# Patient Record
Sex: FEMALE | Race: AMERICAN INDIAN OR ALASKA NATIVE
[De-identification: names, ages, dates, MRNs, and addresses within clinical notes are randomized per-mention and may not be internally consistent; named-entity substitution may affect disease eponyms.]

---

## 2018-04-03 NOTE — MAMMOGRAPHY REPORT
BILATERAL DIGITAL SCREENING MAMMOGRAM with CAD: 04/02/18 10:34:00



CLINICAL: Routine screening.



COMPARISON:11/24/14



FINDINGS: The breasts are heterogeneously dense, which may obscure 

small masses.A right focal asymmetry with calcifications requires 

additional imaging.  The left breast is negative.



IMPRESSION: Right asymmetry and calcifications requiring further workup.



BI-RADS CATEGORY:  0 -- Additional Imaging Evaluation Required



RECOMMENDATION: Recall for right lateralmedial and spot magnification 

ML and CC views and right breast ultrasound.



ACR BI-RADS MAMMOGRAPHIC CODES:

0 = Needs additional imaging evaluation; 1 = Negative; 2 = Benign; 3 = 

Probably benign; 4 = Suspicious; 5 = Malignant; 6 = Known biopsy-proven 

malignancy



COMMENT:

      1.   Dense breast tissue, i.e., adenosis, fibrocystic 

            changes, etc., may obscure an underlying neoplasm.

      2.   Approximately 10% of cancers are not detected with

            mammography.

      3.   A negative mammography report should not delay biopsy 

            if a clinically suspicious mass is present.



COMMENT:

Patient follow-up letters are generated via our [employer] application.

## 2018-05-29 ENCOUNTER — HOSPITAL ENCOUNTER (OUTPATIENT)
Dept: HOSPITAL 5 - SPVWC | Age: 63
Discharge: HOME | End: 2018-05-29
Attending: INTERNAL MEDICINE
Payer: COMMERCIAL

## 2018-05-29 DIAGNOSIS — I10: ICD-10-CM

## 2018-05-29 DIAGNOSIS — N60.01: Primary | ICD-10-CM

## 2018-05-29 DIAGNOSIS — N64.89: ICD-10-CM

## 2018-05-29 DIAGNOSIS — J44.9: ICD-10-CM

## 2018-05-29 DIAGNOSIS — Z87.891: ICD-10-CM

## 2018-05-30 NOTE — MAMMOGRAPHY REPORT
RIGHT DIGITAL DIAGNOSTIC MAMMOGRAM : 05/29/18 10:31:00



CLINICAL: Recall to evaluate a right asymmetry with calcifications.



COMPARISON:11/24/14 screening mammogram



FINDINGS: Lateralmedial and spot magnification MLO and CC views were 

performed.  An irregular mass with ill-defined margins persists on all 

views.  Suspicious fine pleomorphic calcifications are associated with 

the mass and the calcifications scan at least 3 cm.  2 additional 

groups of calcifications on the MLO view are not significantly changed 

since the last mammogram.



Ultrasound of right breast (including all four quadrants and the 

retroareolar area) was performed.  A solid irregular hypoechoic mass at 

2 o'clock 3 cm from the nipple measures 1.2 x 0.9 x 0.8 cm and 

corresponds to the mammographic mass.  Calcifications are identified 

within the mass and there is some shadowing from the mass.  An oval 

solid relatively smooth mass at 11 o'clock 3 cm from nipple measures 8 

x 3 x 8 mm.  A solid irregular hypoechoic official retroareolar mass at 

3 o'clock measures 6 x 5 x 5 mm.  A cyst at 10 o'clock 3 cm from the 

nipple measures four a 4 x 3 mm and a cyst at 8 o'clock 6 cm from the 

nipple measures 4 x 2 x 4 mm.  Ultrasound of the right axilla 

demonstrated no suspicious lymph nodes.





IMPRESSION: A highly suspicious 1.2 cm right breast mass with highly 

suspicious calcifications at 2 o'clock 3 cm from the nipple.  Recommend 

ultrasound guided needle core biopsy of the right breast.



BI-RADS CATEGORY:  4--Suspicious



I discussed the findings and the recommendation for needle core biopsy 

of the right breast with the patient at the time of the examination.



ACR BI-RADS MAMMOGRAPHIC CODES:

0 = Needs additional imaging evaluation; 1 = Negative; 2 = Benign; 3 = 

Probably benign; 4 = Suspicious; 5 = Malignant; 6 = Known biopsy-proven 

malignancy



COMMENT:

      1.   Dense breast tissue, i.e., adenosis, fibrocystic 

            changes, etc., may obscure an underlying neoplasm.

      2.   Approximately 10% of cancers are not detected with

            mammography.

      3.   A negative mammography report should not delay biopsy 

            if a clinically suspicious mass is present.





COMMENT:

Patient follow-up letters are generated via our [employer] 

application.

## 2018-05-30 NOTE — ULTRASOUND REPORT
RIGHT DIGITAL DIAGNOSTIC MAMMOGRAM : 05/29/18 10:31:00



CLINICAL: Recall to evaluate a right asymmetry with calcifications.



COMPARISON:11/24/14 screening mammogram



FINDINGS: Lateralmedial and spot magnification MLO and CC views were 

performed.  An irregular mass with ill-defined margins persists on all 

views.  Suspicious fine pleomorphic calcifications are associated with 

the mass and the calcifications scan at least 3 cm.  2 additional 

groups of calcifications on the MLO view are not significantly changed 

since the last mammogram.



Ultrasound of right breast (including all four quadrants and the 

retroareolar area) was performed.  A solid irregular hypoechoic mass at 

2 o'clock 3 cm from the nipple measures 1.2 x 0.9 x 0.8 cm and 

corresponds to the mammographic mass.  Calcifications are identified 

within the mass and there is some shadowing from the mass.  An oval 

solid relatively smooth mass at 11 o'clock 3 cm from nipple measures 8 

x 3 x 8 mm.  A solid irregular hypoechoic official retroareolar mass at 

3 o'clock measures 6 x 5 x 5 mm.  A cyst at 10 o'clock 3 cm from the 

nipple measures four a 4 x 3 mm and a cyst at 8 o'clock 6 cm from the 

nipple measures 4 x 2 x 4 mm.  Ultrasound of the right axilla 

demonstrated no suspicious lymph nodes.





IMPRESSION: A highly suspicious 1.2 cm right breast mass with highly 

suspicious calcifications at 2 o'clock 3 cm from the nipple.  Recommend 

ultrasound guided needle core biopsy of the right breast.



BI-RADS CATEGORY:  4--Suspicious



I discussed the findings and the recommendation for needle core biopsy 

of the right breast with the patient at the time of the examination.



ACR BI-RADS MAMMOGRAPHIC CODES:

0 = Needs additional imaging evaluation; 1 = Negative; 2 = Benign; 3 = 

Probably benign; 4 = Suspicious; 5 = Malignant; 6 = Known biopsy-proven 

malignancy



COMMENT:

      1.   Dense breast tissue, i.e., adenosis, fibrocystic 

            changes, etc., may obscure an underlying neoplasm.

      2.   Approximately 10% of cancers are not detected with

            mammography.

      3.   A negative mammography report should not delay biopsy 

            if a clinically suspicious mass is present.





COMMENT:

Patient follow-up letters are generated via our [employer] 

application.

## 2018-06-07 ENCOUNTER — HOSPITAL ENCOUNTER (OUTPATIENT)
Dept: HOSPITAL 5 - SPVWC | Age: 63
Discharge: HOME | End: 2018-06-07
Attending: INTERNAL MEDICINE
Payer: COMMERCIAL

## 2018-06-07 DIAGNOSIS — Z17.0: ICD-10-CM

## 2018-06-07 DIAGNOSIS — D05.11: ICD-10-CM

## 2018-06-07 DIAGNOSIS — C50.211: Primary | ICD-10-CM

## 2018-06-07 PROCEDURE — A4648 IMPLANTABLE TISSUE MARKER: HCPCS

## 2018-06-07 PROCEDURE — 77065 DX MAMMO INCL CAD UNI: CPT

## 2018-06-07 PROCEDURE — 88361 TUMOR IMMUNOHISTOCHEM/COMPUT: CPT

## 2018-06-07 PROCEDURE — 88342 IMHCHEM/IMCYTCHM 1ST ANTB: CPT

## 2018-06-07 PROCEDURE — 19083 BX BREAST 1ST LESION US IMAG: CPT

## 2018-06-07 PROCEDURE — 88305 TISSUE EXAM BY PATHOLOGIST: CPT

## 2018-06-07 NOTE — ULTRASOUND REPORT
ULTRASOUND GUIDED NEEDLE CORE BIOPSY RIGHT BREAST WITH CLIP PLACEMENT: 

06/07/18 09:30:00



CLINICAL: A 1.2 cm right breast mass at 2 o'clock 3 cm from the nipple.



COMPARISON :05/29/18



FINDINGS: The procedure was explained to the patient and informed 

consent was obtained.  



Ultrasound demonstrated the previously described mass.



I marked the breast with a felt tip marker and a time out was called. 

The skin was prepped with Betadine and anesthetized with 1% lidocaine. 

Needle core biopsy was performed through a tiny dermatotomy using 

ultrasound guidance, 2% lidocaine with epinephrine for deep anesthesia 

and a 14-gauge Achieve biopsy device.  3 cores were obtained and placed 

in formalin.  A clip was deployed within the mass. The patient 

tolerated the procedure well and there were no apparent complications.  

Hemostasis was achieved with minimal pressure and a sterile dressing 

was applied. A two view mammogram demonstrated concordant placement of 

the clip. She left the department in good condition and was given 

instructions for wound care and followup.



IMPRESSION: Uncomplicated ultrasound guided needle core biopsy with 

clip placement right breast.

## 2018-06-07 NOTE — MAMMOGRAPHY REPORT
RIGHT DIGITAL DIAGNOSTIC MAMMOGRAM: 06/07/18 08:43:00



CLINICAL: For clip placement immediately status post ultrasound biopsy.



COMPARISON:05/29/18



FINDINGS: A biopsy clip is now identified within the mass at 2 o'clock. 





IMPRESSION: Concordant clip placement status post ultrasound biopsy.



BI-RADS CATEGORY:  4--Suspicious



Pathology pending.

## 2018-07-09 ENCOUNTER — HOSPITAL ENCOUNTER (OUTPATIENT)
Dept: HOSPITAL 5 - SPVIMAG | Age: 63
Discharge: HOME | End: 2018-07-09
Attending: SURGERY
Payer: COMMERCIAL

## 2018-07-09 DIAGNOSIS — C50.211: Primary | ICD-10-CM

## 2018-07-09 DIAGNOSIS — I10: ICD-10-CM

## 2018-07-09 DIAGNOSIS — Z87.891: ICD-10-CM

## 2018-07-09 DIAGNOSIS — J44.9: ICD-10-CM

## 2018-07-09 PROCEDURE — 77059: CPT

## 2018-07-09 PROCEDURE — C8908 MRI W/O FOL W/CONT, BREAST,: HCPCS

## 2018-07-09 PROCEDURE — A9577 INJ MULTIHANCE: HCPCS

## 2018-07-10 ENCOUNTER — HOSPITAL ENCOUNTER (OUTPATIENT)
Dept: HOSPITAL 5 - SPVWC | Age: 63
Discharge: HOME | End: 2018-07-10
Attending: SURGERY
Payer: COMMERCIAL

## 2018-07-10 DIAGNOSIS — Z87.891: ICD-10-CM

## 2018-07-10 DIAGNOSIS — J44.9: ICD-10-CM

## 2018-07-10 DIAGNOSIS — I10: ICD-10-CM

## 2018-07-10 DIAGNOSIS — C50.911: Primary | ICD-10-CM

## 2018-07-10 NOTE — MAMMOGRAPHY REPORT
RIGHT DIGITAL DIAGNOSTIC MAMMOGRAM : 07/10/18 12:12:00



CLINICAL: Recently diagnosed right breast cancer and additional 

suspicious calcifications in the same breast on previous mammograms.



COMPARISON:06/07/18, 05/29/18 and 04/02/18



FINDINGS: Spot magnification ML and CT views were performed and 

demonstrate several groups of suspicious calcifications which are 

separate from the known cancer.  The known cancer is medial and there 

are numerous amorphous calcifications in the outer breast which have 

similar morphology as the cancer.  These calcifications may be in the 

inferior outer breast since there is suggestion of similar 

calcifications on the ML mag view.  I have labeled as target #1.  In 

addition, a cluster of calcifications in the inner breast have 

suspicious features.  They're located approximately 5 cm from the 

leading edge of the known cancer on the lateral spot image and are 

labeled as target #2.  These calcifications are superior and posterior 

to the cancer.





IMPRESSION: Known right breast cancer and at least 2 groups of 

suspicious calcifications for which stereotactic biopsy is recommended.



BI-RADS CATEGORY:  6--Known Cancer







ACR BI-RADS MAMMOGRAPHIC CODES:

0 = Needs additional imaging evaluation; 1 = Negative; 2 = Benign; 3 = 

Probably benign; 4 = Suspicious; 5 = Malignant; 6 = Known biopsy-proven 

malignancy



COMMENT:

      1.   Dense breast tissue, i.e., adenosis, fibrocystic 

            changes, etc., may obscure an underlying neoplasm.

      2.   Approximately 10% of cancers are not detected with

            mammography.

      3.   A negative mammography report should not delay biopsy 

            if a clinically suspicious mass is present.





COMMENT:

Patient follow-up letters are generated by our [employer] application.

## 2018-07-10 NOTE — MAGNETIC RESONANCE REPORT
BILATERAL BREAST MRI WITHOUT AND WITH CONTRAST: 07/09/18 12:28:00



CLINICAL: Newly diagnosed right breast cancer.  Status post right 

ultrasound-guided needle biopsy 06/07/18 with pathologic diagnosis of 

invasive carcinoma NOS, Daljit grade II.



COMPARISON:Recent mammograms and right breast ultrasound..



TECHNIQUE: Axial 1.0-mm T1 without,  axial high resolution 2.0-mm T2 

and axial 1.0-mm dynamic Vibrant high-resolution postcontrast T1 fat 

saturation sequences on a 1.5 Ledy magnet.  The examination was 

performed with an 8 channel dedicated [employer] breast coil. Post 

processing with CAD and subtraction was performed on an [employer] 

workstation.  18.0 cc of Multihance was injected without incident for 

the contrast portion of the exam. Consent was obtained prior to the 

administration of the contrast.  



FINDINGS:



Right: Minimal background parenchymal enhancement.  The known cancer is 

an irregular enhancing mass with a biopsy clip at 3 o'clock 6.9 cm from 

the nipple measuring 2.3 x 1.2 x 1.0 cm.  It demonstrates heterogeneous 

enhancement with mixed kinetics, 111% peak enhancement, 44% type I 

persistent, 54% type II plateau and 2% type III washout.  Lesion 2 is 

focal non-Mass enhancement approximately 1 cm anterior and superior to 

the known cancer.  It measures 2.2 x 1.5 x 1.2 mm and demonstrates 123% 

peak enhancement, 59% type I persistent, 41% type II plateau and 0% 

type III washout.  No other mass or suspicious enhancement of the right 

breast.  A few tiny cysts and no masses to correlate with what are 

described to be solid masses at 11 o'clock 3 cm from the nipple and at 

3 o'clock subareolar on the recent ultrasound.  No suspicious right 

axillary or right internal mammary lymph nodes.



Left: Minimal background parenchymal enhancement.  No mass or 

suspicious enhancement.  No suspicious left axillary or left internal 

mammary lymph nodes.



IMPRESSION: 1.  A known 2.3 cm right breast cancer at 3 o'clock 

approximately 7 cm from the nipple.  2.  One additional 2 mm suspicious 

lesion within 1 cm of the known cancer but no other suspicious lesions 

of the right breast.  No lesions to correlate with solid masses 

described by recent ultrasound to be located subareolar at 3 o'clock 

and at 11 o'clock 3 cm from the nipple.  However, recent mammograms 

demonstrate suspicious calcifications which are separate from the known 

cancer and a two site right stereotactic biopsy is recommended to 

exclude multicentric tumor which may only be evident by calcifications. 

 3.  Negative left breast.  4.  No suspicious lymph nodes.



RIGHT BI-RADS  6 -- Known Cancer





LEFT BI-RADS 1 -- Negative

## 2018-07-18 ENCOUNTER — HOSPITAL ENCOUNTER (OUTPATIENT)
Dept: HOSPITAL 5 - SPVWC | Age: 63
Discharge: HOME | End: 2018-07-18
Attending: SURGERY
Payer: COMMERCIAL

## 2018-07-18 DIAGNOSIS — R92.0: ICD-10-CM

## 2018-07-18 DIAGNOSIS — C50.311: ICD-10-CM

## 2018-07-18 DIAGNOSIS — N60.21: Primary | ICD-10-CM

## 2018-07-18 PROCEDURE — A4648 IMPLANTABLE TISSUE MARKER: HCPCS

## 2018-07-18 PROCEDURE — 77065 DX MAMMO INCL CAD UNI: CPT

## 2018-07-18 PROCEDURE — 88305 TISSUE EXAM BY PATHOLOGIST: CPT

## 2018-07-18 PROCEDURE — 19081 BX BREAST 1ST LESION STRTCTC: CPT

## 2018-07-18 NOTE — MAMMOGRAPHY REPORT
STEREOTACTIC VACUUM ASSISTED BIOPSY WITH CLIP PLACEMENT RIGHT BREAST: 

07/18/18 10:03:00





CLINICAL: Known cancer in the lower inner quadrant of the same breast.



COMPARISON:07/10/18



FINDINGS: Consent for the procedure was obtained.  A group of 

suspicious calcifications in the upper outer quadrant was targeted with 

stereotactic guidance. 



The skin was prepped with Betadine and anesthetized with 1% lidocaine. 

2% lidocaine with epinephrine was injected for deeper anesthesia. 8 

gauge Mammotome biopsy was performed from a CC from above approach 

through a small dermatotomy.  Prefire and post-fire images demonstrated 

satisfactory positioning of the probe. Samples were obtained around the 

clock face.  A specimen radiograph confirmed satisfactory sampling with 

removal of representative punctate calcifications.  A clip was placed 

at the biopsy site and the deployment was confirmed with a radiograph.  

The probe was removed and hemostasis was achieved with mild pressure.  

A sterile dressing was applied.  The patient tolerated the procedure 

well and there were no apparent complications.  



A 2 view mammogram demonstrated removal of some calcifications and 

concordant deployment of the biopsy clip.



IMPRESSION: Uncomplicated stereotactic biopsy with clip placement right 

breast.The patient did not want to have a second site biopsied on this 

day but intends to return next week for stereotactic biopsy of a second 

group of calcifications.

## 2018-07-18 NOTE — MAMMOGRAPHY REPORT
RIGHT DIGITAL DIAGNOSTIC MAMMOGRAM: 07/18/18 10:03:00



CLINICAL: For clip placement immediately status post stereotactic 

biopsy of calcifications.



COMPARISON:07/10/18



FINDINGS: A biopsy clip is now identified in the upper outer quadrant 

and the clip position is concordant with the intended target.  Some 

calcifications have been removed. 





IMPRESSION: Concordant clip placement status post stereotactic 

biopsy.Known cancer in the same breast.



BI-RADS CATEGORY:  6--Known Cancer



Pathology pending.

## 2018-07-26 ENCOUNTER — HOSPITAL ENCOUNTER (OUTPATIENT)
Dept: HOSPITAL 5 - SPVWC | Age: 63
Discharge: HOME | End: 2018-07-26
Attending: SURGERY
Payer: COMMERCIAL

## 2018-07-26 DIAGNOSIS — N60.11: Primary | ICD-10-CM

## 2018-07-26 DIAGNOSIS — Z87.891: ICD-10-CM

## 2018-07-26 DIAGNOSIS — Z86.718: ICD-10-CM

## 2018-07-26 DIAGNOSIS — I10: ICD-10-CM

## 2018-07-26 DIAGNOSIS — J44.1: ICD-10-CM

## 2018-07-26 DIAGNOSIS — R92.0: ICD-10-CM

## 2018-07-26 PROCEDURE — 19081 BX BREAST 1ST LESION STRTCTC: CPT

## 2018-07-26 PROCEDURE — 77065 DX MAMMO INCL CAD UNI: CPT

## 2018-07-26 PROCEDURE — 88305 TISSUE EXAM BY PATHOLOGIST: CPT

## 2018-07-26 PROCEDURE — A4648 IMPLANTABLE TISSUE MARKER: HCPCS

## 2018-07-26 NOTE — MAMMOGRAPHY REPORT
STEREOTACTIC VACUUM ASSISTED BIOPSY WITH CLIP PLACEMENT RIGHT BREAST: 

07/26/18 09:52:00





CLINICAL: Known right breast cancer and additional suspicious 

calcifications in the same quadrant of the breast.



COMPARISON:07/10/18



FINDINGS: Consent for the procedure was obtained.  The group of 

calcifications labeled as target #2 on the previous mammogram was 

targeted with stereotactic guidance. 



The skin was prepped with Betadine and anesthetized with 1% lidocaine. 

2% lidocaine with epinephrine was injected for deeper anesthesia. 8 

gauge Mammotome biopsy was performed from a CC from above approach 

through a small dermatotomy.  Prefire and post-fire images demonstrated 

satisfactory positioning of the probe. Samples were obtained around the 

clock face.  A specimen radiograph confirmed satisfactory sampling with 

removal of representative calcifications.  A clip was placed at the 

biopsy site and the placement was confirmed with an image.  The probe 

was removed and hemostasis was achieved with mild pressure.  A sterile 

dressing was applied.  The patient tolerated the procedure well and 

there were no apparent complications.  



Two view mammogram demonstrated removal of calcifications in concordant 

deployment of the biopsy clip.The biopsy clip is approximately 3 cm 

posterior to the clip for the known cancer.



IMPRESSION: Uncomplicated stereotactic biopsy with clip placement right 

breast.

## 2018-07-26 NOTE — MAMMOGRAPHY REPORT
RIGHT DIGITAL DIAGNOSTIC MAMMOGRAM: 07/26/18 09:52:00



CLINICAL: For clip placement immediately status post stereotactic 

biopsy of the inner quadrant.  Known cancer in the same quadrant of the 

breast.  The current biopsy site correlates with target #2 from the 

07/10/18 mammogram.



COMPARISON:07/10/18 and 07/18/18 mammograms



FINDINGS: A new biopsy clip is now identified in the inner breast and 

it is located 3 cm posterior to the clip at the known cancer.  A biopsy 

clip in the upper outer quadrant closer to the nipple correlates with a 

benign biopsy performed on 07/18/18.





IMPRESSION: Concordant clip placement status post stereotactic biopsy 

of the inner right breast.



BI-RADS CATEGORY:  6--Known Cancer



Pathology pending.

## 2018-08-08 ENCOUNTER — HOSPITAL ENCOUNTER (OUTPATIENT)
Dept: HOSPITAL 5 - SPVWC | Age: 63
Discharge: HOME | End: 2018-08-08
Attending: SURGERY
Payer: COMMERCIAL

## 2018-08-08 DIAGNOSIS — I10: ICD-10-CM

## 2018-08-08 DIAGNOSIS — R92.0: ICD-10-CM

## 2018-08-08 DIAGNOSIS — Z72.89: ICD-10-CM

## 2018-08-08 DIAGNOSIS — Z79.899: ICD-10-CM

## 2018-08-08 DIAGNOSIS — J44.1: ICD-10-CM

## 2018-08-08 DIAGNOSIS — Z87.891: ICD-10-CM

## 2018-08-08 DIAGNOSIS — N64.89: Primary | ICD-10-CM

## 2018-08-08 PROCEDURE — A4648 IMPLANTABLE TISSUE MARKER: HCPCS

## 2018-08-08 PROCEDURE — 88305 TISSUE EXAM BY PATHOLOGIST: CPT

## 2018-08-08 NOTE — ULTRASOUND REPORT
ULTRASOUND GUIDED NEEDLE CORE BIOPSY RIGHT BREAST WITH CLIP PLACEMENT: 

08/08/18 



CLINICAL: Known right breast cancer.  Suspicious superficial 6 mm mass 

at 3 o'clock subareolar at the edge of the areola.



COMPARISON :05/29/18



FINDINGS: The procedure was explained to the patient and informed 

consent was obtained.  



Ultrasound demonstrated the previously described 6 mm subareolar mass 

at 3 o'clock..



I marked the breast with a felt tip marker and a time out was called. 

The skin was prepped with Betadine and anesthetized with 1% lidocaine. 

Needle core biopsy was performed through a tiny dermatotomy using 

ultrasound guidance, 2% lidocaine with epinephrine for deep anesthesia 

and a 14-gauge Achieve biopsy device.  Multiple cores were obtained and 

placed in formalin.  A clip was deployed within the mass. The patient 

tolerated the procedure well and there were no apparent complications.  

Hemostasis was achieved with minimal pressure and a sterile dressing 

was applied. A two view mammogram demonstrated satisfactory clip 

deployment. She left the department in good condition and was given 

instructions for wound care and followup.



IMPRESSION: Uncomplicated ultrasound guided needle core biopsy with 

clip placement right breast.

## 2018-08-08 NOTE — MAMMOGRAPHY REPORT
RIGHT DIGITAL DIAGNOSTIC MAMMOGRAM: 08/08/18 12:53:00



CLINICAL: For clip placement immediately status post ultrasound biopsy.



COMPARISON:07/26/18



FINDINGS: A biopsy clip is now identified at 3 o'clock in the anterior 

one third of the breast.  Three additional biopsy clips are identified. 





IMPRESSION: Concordant clip placement status post ultrasound biopsy.



BI-RADS CATEGORY:  4--Suspicious



Pathology pending.

## 2018-08-21 ENCOUNTER — HOSPITAL ENCOUNTER (INPATIENT)
Dept: HOSPITAL 5 - ED | Age: 63
LOS: 3 days | Discharge: HOME | DRG: 291 | End: 2018-08-24
Attending: INTERNAL MEDICINE | Admitting: INTERNAL MEDICINE
Payer: COMMERCIAL

## 2018-08-21 DIAGNOSIS — I11.0: Primary | ICD-10-CM

## 2018-08-21 DIAGNOSIS — J96.01: ICD-10-CM

## 2018-08-21 DIAGNOSIS — E78.5: ICD-10-CM

## 2018-08-21 DIAGNOSIS — Z82.49: ICD-10-CM

## 2018-08-21 DIAGNOSIS — Z85.3: ICD-10-CM

## 2018-08-21 DIAGNOSIS — I50.31: ICD-10-CM

## 2018-08-21 DIAGNOSIS — Z98.51: ICD-10-CM

## 2018-08-21 DIAGNOSIS — F17.200: ICD-10-CM

## 2018-08-21 DIAGNOSIS — J44.1: ICD-10-CM

## 2018-08-21 DIAGNOSIS — Z79.899: ICD-10-CM

## 2018-08-21 LAB
ALBUMIN SERPL-MCNC: 4.1 G/DL (ref 3.9–5)
ALT SERPL-CCNC: 12 UNITS/L (ref 7–56)
APTT BLD: 28.6 SEC. (ref 24.2–36.6)
BASOPHILS # (AUTO): 0 K/MM3 (ref 0–0.1)
BASOPHILS NFR BLD AUTO: 0.6 % (ref 0–1.8)
BUN SERPL-MCNC: 6 MG/DL (ref 7–17)
BUN/CREAT SERPL: 8 %
CALCIUM SERPL-MCNC: 9.4 MG/DL (ref 8.4–10.2)
EOSINOPHIL # BLD AUTO: 0.3 K/MM3 (ref 0–0.4)
EOSINOPHIL NFR BLD AUTO: 4.1 % (ref 0–4.3)
HCT VFR BLD CALC: 44.9 % (ref 30.3–42.9)
HDLC SERPL-MCNC: 60 MG/DL (ref 40–59)
HEMOLYSIS INDEX: 19
HGB BLD-MCNC: 15.2 GM/DL (ref 10.1–14.3)
INR PPP: 0.92 (ref 0.87–1.13)
LYMPHOCYTES # BLD AUTO: 2.4 K/MM3 (ref 1.2–5.4)
LYMPHOCYTES NFR BLD AUTO: 32.2 % (ref 13.4–35)
MCH RBC QN AUTO: 31 PG (ref 28–32)
MCHC RBC AUTO-ENTMCNC: 34 % (ref 30–34)
MCV RBC AUTO: 93 FL (ref 79–97)
MONOCYTES # (AUTO): 0.7 K/MM3 (ref 0–0.8)
MONOCYTES % (AUTO): 9.3 % (ref 0–7.3)
PLATELET # BLD: 310 K/MM3 (ref 140–440)
RBC # BLD AUTO: 4.85 M/MM3 (ref 3.65–5.03)

## 2018-08-21 PROCEDURE — 93010 ELECTROCARDIOGRAM REPORT: CPT

## 2018-08-21 PROCEDURE — 85610 PROTHROMBIN TIME: CPT

## 2018-08-21 PROCEDURE — 93306 TTE W/DOPPLER COMPLETE: CPT

## 2018-08-21 PROCEDURE — 80053 COMPREHEN METABOLIC PANEL: CPT

## 2018-08-21 PROCEDURE — 78452 HT MUSCLE IMAGE SPECT MULT: CPT

## 2018-08-21 PROCEDURE — 94644 CONT INHLJ TX 1ST HOUR: CPT

## 2018-08-21 PROCEDURE — 94640 AIRWAY INHALATION TREATMENT: CPT

## 2018-08-21 PROCEDURE — 4A033R1 MEASUREMENT OF ARTERIAL SATURATION, PERIPHERAL, PERCUTANEOUS APPROACH: ICD-10-PCS | Performed by: INTERNAL MEDICINE

## 2018-08-21 PROCEDURE — A9502 TC99M TETROFOSMIN: HCPCS

## 2018-08-21 PROCEDURE — 36415 COLL VENOUS BLD VENIPUNCTURE: CPT

## 2018-08-21 PROCEDURE — 85379 FIBRIN DEGRADATION QUANT: CPT

## 2018-08-21 PROCEDURE — 93017 CV STRESS TEST TRACING ONLY: CPT

## 2018-08-21 PROCEDURE — 80061 LIPID PANEL: CPT

## 2018-08-21 PROCEDURE — 85025 COMPLETE CBC W/AUTO DIFF WBC: CPT

## 2018-08-21 PROCEDURE — 94760 N-INVAS EAR/PLS OXIMETRY 1: CPT

## 2018-08-21 PROCEDURE — 71045 X-RAY EXAM CHEST 1 VIEW: CPT

## 2018-08-21 PROCEDURE — 93005 ELECTROCARDIOGRAM TRACING: CPT

## 2018-08-21 PROCEDURE — 85730 THROMBOPLASTIN TIME PARTIAL: CPT

## 2018-08-21 PROCEDURE — 84484 ASSAY OF TROPONIN QUANT: CPT

## 2018-08-21 PROCEDURE — 96365 THER/PROPH/DIAG IV INF INIT: CPT

## 2018-08-21 PROCEDURE — 70450 CT HEAD/BRAIN W/O DYE: CPT

## 2018-08-21 RX ADMIN — Medication SCH ML: at 21:37

## 2018-08-21 RX ADMIN — AMLODIPINE BESYLATE SCH MG: 5 TABLET ORAL at 17:28

## 2018-08-21 RX ADMIN — AMLODIPINE BESYLATE SCH: 5 TABLET ORAL at 17:32

## 2018-08-21 RX ADMIN — BUDESONIDE SCH MG: 0.5 INHALANT RESPIRATORY (INHALATION) at 20:12

## 2018-08-21 RX ADMIN — ARFORMOTEROL TARTRATE SCH MCG: 15 SOLUTION RESPIRATORY (INHALATION) at 20:12

## 2018-08-21 RX ADMIN — FAMOTIDINE SCH MG: 20 TABLET ORAL at 21:36

## 2018-08-21 NOTE — EMERGENCY DEPARTMENT REPORT
ED General Adult HPI





- General


Chief complaint: Dyspnea/Respdistress


Stated complaint: D.I.B


Time Seen by Provider: 08/21/18 11:27


Source: patient, RN notes reviewed, old records reviewed


Mode of arrival: Wheelchair


Limitations: No Limitations





- History of Present Illness


Initial comments: 





This is a 62-year-old female who is not known to this provider previously, has 

a past medical history of hypertension, and right breast cancer, stage I, not 

currently on chemotherapy or radiation therapy, scheduled for surgical 

intervention next week.  She also has a history of COPD, emphysema and is not 

currently on home oxygen.  She presents to the ER with complaint of penis 

shortness of breath.  She is not coughing or producing mucous more than she 

typically does.  She denies leg pain, leg swelling, projectile vomiting, 

abdominal pain, headache, hematemesis, bright red blood per rectum.


In the emergency room, the patient was given albuterol and Atrovent which 

improved her symptoms dramatically.  On review of systems, she also endorses 

intermittent two-week history of chest wall pain, which does not radiate to the 

back, arms or neck.  Apparently, there is been no recent cardiac risk 

stratification





-: Gradual


Location: chest


Radiation: non-radiation


Quality: aching


Consistency: intermittent


Improves with: none


Worsens with: none


Associated Symptoms: denies: confusion, chest pain, cough (chronic cough, but 

not worse), diaphoresis, fever/chills, headaches, loss of appetite, malaise, 

nausea/vomiting, rash, seizure, shortness of breath, syncope, weakness





- Related Data


 Home Medications











 Medication  Instructions  Recorded  Confirmed  Last Taken


 


Albuterol Sulfate [Ventolin HFA] 2 puff IH Q6H PRN 08/21/18 08/21/18 Unknown


 


Amlodipine Besylate [Norvasc] 5 mg PO DAILY 08/21/18 08/21/18 Unknown


 


Budesonide/Formoterol Fumarate 2 puff IH BID 08/21/18 08/21/18 Unknown





[Symbicort 160-4.5 Mcg Inhaler]    


 


Cetirizine HCl [Zyrtec] 10 mg PO QDAY 08/21/18 08/21/18 08/21/18











 Allergies











Allergy/AdvReac Type Severity Reaction Status Date / Time


 


No Known Allergies Allergy   Unverified 06/17/15 10:03














ED Review of Systems


ROS: 


Stated complaint: D.I.B


Other details as noted in HPI





Comment: All other systems reviewed and negative





ED Past Medical Hx





- Past Medical History


Previous Medical History?: Yes


Hx Hypertension: Yes


Hx of Cancer: Yes (breast)


Hx Asthma: Yes


Hx COPD: Yes





- Surgical History


Past Surgical History?: Yes


Additional Surgical History: Right knee orthoscopy.  Tubal ligation





- Social History


Smoking Status: Former Smoker





- Medications


Home Medications: 


 Home Medications











 Medication  Instructions  Recorded  Confirmed  Last Taken  Type


 


Albuterol Sulfate [Ventolin HFA] 2 puff IH Q6H PRN 08/21/18 08/21/18 Unknown 

History


 


Amlodipine Besylate [Norvasc] 5 mg PO DAILY 08/21/18 08/21/18 Unknown History


 


Budesonide/Formoterol Fumarate 2 puff IH BID 08/21/18 08/21/18 Unknown History





[Symbicort 160-4.5 Mcg Inhaler]     


 


Cetirizine HCl [Zyrtec] 10 mg PO QDAY 08/21/18 08/21/18 08/21/18 History














ED Physical Exam





- General


Limitations: No Limitations


General appearance: alert, in no apparent distress





- Head


Head exam: Present: atraumatic, normocephalic





- Eye


Eye exam: Present: normal appearance, EOMI.  Absent: nystagmus





- ENT


ENT exam: Present: normal exam, normal orophraynx, mucous membranes moist, 

normal external ear exam





- Neck


Neck exam: Present: normal inspection, full ROM





- Respiratory


Respiratory exam: Present: respiratory distress, decreased breath sounds.  

Absent: wheezes, rales, rhonchi, stridor





- Cardiovascular


Cardiovascular Exam: Present: regular rate, normal rhythm, normal heart sounds.

  Absent: bradycardia, tachycardia, irregular rhythm, systolic murmur, 

diastolic murmur, rubs, gallop





- GI/Abdominal


GI/Abdominal exam: Present: soft.  Absent: distended, tenderness, guarding, 

rebound, rigid, pulsatile mass





- Extremities Exam


Extremities exam: Present: normal inspection, full ROM, normal capillary refill

, other (2+ pulses noted in the bilateral upper, lower extremities.  

Compartments soft.  No long bony tenderness.  The pelvis is stable.).  Absent: 

tenderness, pedal edema, joint swelling, calf tenderness





- Back Exam


Back exam: Present: normal inspection, full ROM.  Absent: tenderness, CVA 

tenderness (R), paraspinal tenderness, vertebral tenderness





- Neurological Exam


Neurological exam: Present: alert, oriented X3, CN II-XII intact, other (

Extraocular movements intact.  Tongue midline.  No facial droop.  Facial 

sensation intact to light touch in the V1, V2, V3 distribution bilaterally.  5 

and 5 strength in 4 extremities..  Sensation is intact to light touch in 4 

extremities.).  Absent: motor sensory deficit





- Psychiatric


Psychiatric exam: Present: normal affect, normal mood





- Skin


Skin exam: Present: warm, dry, intact, normal color.  Absent: rash





ED Course


 Vital Signs











  08/21/18 08/21/18 08/21/18





  11:25 11:45 11:56


 


Temperature 98.6 F  


 


Pulse Rate 88  75


 


Pulse Rate [  79 





Anterior   





Bilateral   





Throughout]   


 


Respiratory 36 H  





Rate   


 


Blood Pressure 180/106  


 


O2 Sat by Pulse 98  100





Oximetry   














  08/21/18 08/21/18 08/21/18





  12:00 12:15 12:30


 


Temperature   


 


Pulse Rate 74 65 78


 


Pulse Rate [   





Anterior   





Bilateral   





Throughout]   


 


Respiratory   





Rate   


 


Blood Pressure 161/93 161/93 173/96


 


O2 Sat by Pulse 100 100 100





Oximetry   














  08/21/18 08/21/18 08/21/18





  12:45 13:00 13:15


 


Temperature   


 


Pulse Rate 68 68 81


 


Pulse Rate [   





Anterior   





Bilateral   





Throughout]   


 


Respiratory   





Rate   


 


Blood Pressure 173/96 153/89 161/93


 


O2 Sat by Pulse 100 100 95





Oximetry   














  08/21/18





  13:17


 


Temperature 


 


Pulse Rate 85


 


Pulse Rate [ 





Anterior 





Bilateral 





Throughout] 


 


Respiratory 26 H





Rate 


 


Blood Pressure 


 


O2 Sat by Pulse 





Oximetry 














ED Medical Decision Making





- Lab Data


Result diagrams: 


 08/21/18 12:24





 08/21/18 12:24








 Vital Signs











  08/21/18 08/21/18 08/21/18





  11:25 11:45 11:56


 


Temperature 98.6 F  


 


Pulse Rate 88  75


 


Pulse Rate [  79 





Anterior   





Bilateral   





Throughout]   


 


Respiratory 36 H  





Rate   


 


Blood Pressure 180/106  


 


O2 Sat by Pulse 98  100





Oximetry   














  08/21/18 08/21/18 08/21/18





  12:00 12:15 12:30


 


Temperature   


 


Pulse Rate 74 65 78


 


Pulse Rate [   





Anterior   





Bilateral   





Throughout]   


 


Respiratory   





Rate   


 


Blood Pressure 161/93 161/93 173/96


 


O2 Sat by Pulse 100 100 100





Oximetry   














  08/21/18 08/21/18 08/21/18





  12:45 13:00 13:15


 


Temperature   


 


Pulse Rate 68 68 81


 


Pulse Rate [   





Anterior   





Bilateral   





Throughout]   


 


Respiratory   





Rate   


 


Blood Pressure 173/96 153/89 161/93


 


O2 Sat by Pulse 100 100 95





Oximetry   














  08/21/18





  13:17


 


Temperature 


 


Pulse Rate 85


 


Pulse Rate [ 





Anterior 





Bilateral 





Throughout] 


 


Respiratory 26 H





Rate 


 


Blood Pressure 


 


O2 Sat by Pulse 





Oximetry 











 Lab Results











  08/21/18 08/21/18 08/21/18 Range/Units





  12:24 12:24 12:24 


 


WBC   7.6   (4.5-11.0)  K/mm3


 


RBC   4.85   (3.65-5.03)  M/mm3


 


Hgb   15.2 H   (10.1-14.3)  gm/dl


 


Hct   44.9 H   (30.3-42.9)  %


 


MCV   93   (79-97)  fl


 


MCH   31   (28-32)  pg


 


MCHC   34   (30-34)  %


 


RDW   13.7   (13.2-15.2)  %


 


Plt Count   310   (140-440)  K/mm3


 


Lymph % (Auto)   32.2   (13.4-35.0)  %


 


Mono % (Auto)   9.3 H   (0.0-7.3)  %


 


Eos % (Auto)   4.1   (0.0-4.3)  %


 


Baso % (Auto)   0.6   (0.0-1.8)  %


 


Lymph #   2.4   (1.2-5.4)  K/mm3


 


Mono #   0.7   (0.0-0.8)  K/mm3


 


Eos #   0.3   (0.0-0.4)  K/mm3


 


Baso #   0.0   (0.0-0.1)  K/mm3


 


Seg Neutrophils %   53.8   (40.0-70.0)  %


 


Seg Neutrophils #   4.1   (1.8-7.7)  K/mm3


 


PT    12.8  (12.2-14.9)  Sec.


 


INR    0.92  (0.87-1.13)  


 


APTT    28.6  (24.2-36.6)  Sec.


 


D-Dimer    221.88  (0-234)  ng/mlDDU


 


Sodium  140    (137-145)  mmol/L


 


Potassium  3.5 L    (3.6-5.0)  mmol/L


 


Chloride  100.6    ()  mmol/L


 


Carbon Dioxide  26    (22-30)  mmol/L


 


Anion Gap  17    mmol/L


 


BUN  6 L    (7-17)  mg/dL


 


Creatinine  0.8    (0.7-1.2)  mg/dL


 


Estimated GFR  > 60    ml/min


 


BUN/Creatinine Ratio  8    %


 


Glucose  91    ()  mg/dL


 


Calcium  9.4    (8.4-10.2)  mg/dL


 


Total Bilirubin  0.40    (0.1-1.2)  mg/dL


 


AST  17    (5-40)  units/L


 


ALT  12    (7-56)  units/L


 


Alkaline Phosphatase  57    ()  units/L


 


Troponin T     (0.00-0.029)  ng/mL


 


Total Protein  7.2    (6.3-8.2)  g/dL


 


Albumin  4.1    (3.9-5)  g/dL


 


Albumin/Globulin Ratio  1.3    %














  08/21/18 Range/Units





  12:24 


 


WBC   (4.5-11.0)  K/mm3


 


RBC   (3.65-5.03)  M/mm3


 


Hgb   (10.1-14.3)  gm/dl


 


Hct   (30.3-42.9)  %


 


MCV   (79-97)  fl


 


MCH   (28-32)  pg


 


MCHC   (30-34)  %


 


RDW   (13.2-15.2)  %


 


Plt Count   (140-440)  K/mm3


 


Lymph % (Auto)   (13.4-35.0)  %


 


Mono % (Auto)   (0.0-7.3)  %


 


Eos % (Auto)   (0.0-4.3)  %


 


Baso % (Auto)   (0.0-1.8)  %


 


Lymph #   (1.2-5.4)  K/mm3


 


Mono #   (0.0-0.8)  K/mm3


 


Eos #   (0.0-0.4)  K/mm3


 


Baso #   (0.0-0.1)  K/mm3


 


Seg Neutrophils %   (40.0-70.0)  %


 


Seg Neutrophils #   (1.8-7.7)  K/mm3


 


PT   (12.2-14.9)  Sec.


 


INR   (0.87-1.13)  


 


APTT   (24.2-36.6)  Sec.


 


D-Dimer   (0-234)  ng/mlDDU


 


Sodium   (137-145)  mmol/L


 


Potassium   (3.6-5.0)  mmol/L


 


Chloride   ()  mmol/L


 


Carbon Dioxide   (22-30)  mmol/L


 


Anion Gap   mmol/L


 


BUN   (7-17)  mg/dL


 


Creatinine   (0.7-1.2)  mg/dL


 


Estimated GFR   ml/min


 


BUN/Creatinine Ratio   %


 


Glucose   ()  mg/dL


 


Calcium   (8.4-10.2)  mg/dL


 


Total Bilirubin   (0.1-1.2)  mg/dL


 


AST   (5-40)  units/L


 


ALT   (7-56)  units/L


 


Alkaline Phosphatase   ()  units/L


 


Troponin T  0.039 H  (0.00-0.029)  ng/mL


 


Total Protein   (6.3-8.2)  g/dL


 


Albumin   (3.9-5)  g/dL


 


Albumin/Globulin Ratio   %














- EKG Data


-: EKG Interpreted by Me


EKG shows normal: sinus rhythm


Rate: normal





- EKG Data





08/21/18 13:32


Sinus, 74 bpm, borderline left axis deviation, poor R-wave progression, low 

voltage, , abnormal EKG, not a STEMI, appears mostly unchanged when compared to 

prior from June 2015.





- Radiology Data


Radiology results: report reviewed, image reviewed





X-ray of the chest shows hyperinflated lungs, no acute disease otherwise





- Medical Decision Making





Differential diagnosis, including but not limited to: COPD, pneumonia, 

pulmonary embolus, acute coronary syndrome, pneumothorax, congestive heart 

failure, pulmonary hypertension, anemia, pericardial effusion














Assessment and plan: 62-year-old female with complaints of initially painless 

shortness of breath, no obvious wheezing, rales or rhonchi.  She is treated 

empirically with albuterol, Atrovent and reports that her symptoms improved.  

There is no JVD, the cardiac silhouette does not appear to be enlarged on x-ray

, and there is no pericardial rub and there is no lower extremity edema.  

Therefore, clinically doubt pericardial effusion and congestive heart failure.  

I found the patient to be low risk by well's criteria and a d-dimer was also 

negative, and she is saturating well on room air and on supplemental oxygen.  

The troponin came back elevated in the context of normal renal function, I 

suspect that this is secondary to underlying cardiac strain secondary to her 

underlying emphysema. 





 Chest pain that seems to be atypical.  Given her positive troponin, the 

patient will be admitted to the hospital for cardiac risk stratification.





 However, Dr. Lawler graciously accepted the patient to his medical service. 





Critical care attestation.: 


If time is entered above; I have spent that time in minutes in the direct care 

of this critically ill patient, excluding procedure time.








ED Disposition


Clinical Impression: 


 COPD exacerbation, Elevated troponin





Disposition: [id number] OP ADMIT IP TO THIS HOSP


Is pt being admited?: Yes


Does the pt Need Aspirin: Yes


Condition: Stable


Instructions:  Chronic Bronchitis (ED)

## 2018-08-21 NOTE — HISTORY AND PHYSICAL REPORT
History of Present Illness


Chief complaint: 





Im short of breath, and I have chest pain. 


History of present illness: 


63 YO Female with HTN, Breast Cancer, COPD, Asthma presents to ED for 

evaluation. Pt states that she has experienced pain her chest and shortness of 

breath over the past 1 week. Pt states that her pain is episodic in nature and 

lasts for several minutes at a time. Pt states that pain is 6/10, substernal, 

nonradiating, not worsened with exertion, relieved with rest, associated with 

shortness of breath, crushing in nature. Pt denies fever, chills,palpitatons, 

NVD, syncope, BRBPR, Unintentional weight loss, night sweats, bone pain, or 

recent ill contacts. Pt states that her breathing got progressively worse after 

she accidentally inhaled fumes while cleaning her oven with oven . Pt 

seen and evaluated in ED and found to have COPD Exacerbation, Acute Respiratory 

Failure, ACS and Diastolic CHF.  Pt admitted to telemetry. Cardiology consulted 

in ED. 


 





Past History


Past Medical History: cancer, COPD, hypertension


Past Surgical History: Other (Right Knee surgery, )


Social history: single.  denies: smoking, alcohol abuse, prescription drug abuse


Family history: hypertension





Medications and Allergies


 Allergies











Allergy/AdvReac Type Severity Reaction Status Date / Time


 


No Known Allergies Allergy   Unverified 06/17/15 10:03











 Home Medications











 Medication  Instructions  Recorded  Confirmed  Last Taken  Type


 


Albuterol Sulfate [Ventolin HFA] 2 puff IH Q6H PRN 08/21/18 08/21/18 Unknown 

History


 


Amlodipine Besylate [Norvasc] 5 mg PO DAILY 08/21/18 08/21/18 Unknown History


 


Budesonide/Formoterol Fumarate 2 puff IH BID 08/21/18 08/21/18 Unknown History





[Symbicort 160-4.5 Mcg Inhaler]     


 


Cetirizine HCl [Zyrtec] 10 mg PO QDAY 08/21/18 08/21/18 08/21/18 History














Review of Systems


Constitutional: no weight loss, no weight gain, no fever, no chills


Ears, nose, mouth and throat: no ear pain, no ear discharge, no tinnitis, no 

decreased hearing, no nose pain, no nasal congestion


Breasts: no change in shape, no swelling, no mass


Cardiovascular: no chest pain, no orthopnea, no palpitations, no rapid/

irregular heart beat, no edema, no syncope


Respiratory: no cough, no cough with sputum, no excessive sputum, no hemoptysis

, no shortness of breath


Gastrointestinal: no nausea, no vomiting, no diarrhea, no constipation


Genitourinary Female: no pelvic pain, no flank pain, no menorrhagia, no dysuria

, no urinary frequency, no urgency


Rectal: no pain, no incontinence, no bleeding


Musculoskeletal: no neck pain, no shooting arm pain, no arm numbness/tingling, 

no low back pain, no shooting leg pain


Integumentary: no rash, no pruritis, no redness, no sores, no wounds


Neurological: no transient paralysis, no paralysis, no weakness, no parathesias

, no numbness, no tingling


Psychiatric: no anxiety, no memory loss, no change in sleep habits, no sleep 

disturbances, no insomnia, no hypersomnia


Endocrine: no cold intolerance, no heat intolerance, no polyphagia, no 

excessive thirst, no polydipsia, no polyuria


Hematologic/Lymphatic: no easy bruising, no easy bleeding, no lymphadenopathy, 

no lymphedema


Allergic/Immunologic: no urticaria, no allergic rhinitis, no wheezing





Exam





- Constitutional


Vitals: 


 











Temp Pulse Resp BP Pulse Ox


 


 98.6 F   85   26 H  161/93   95 


 


 08/21/18 11:25  08/21/18 13:17  08/21/18 13:17  08/21/18 13:15  08/21/18 13:15











General appearance: Present: no acute distress, well-nourished





- EENT


Eyes: Present: PERRL


ENT: hearing intact, clear oral mucosa





- Neck


Neck: Present: supple, normal ROM





- Respiratory


Respiratory effort: normal


Respiratory: bilateral: CTA





- Cardiovascular


Heart Sounds: Present: S1 & S2.  Absent: rub, click





- Extremities


Extremities: pulses symmetrical, No edema


Peripheral Pulses: within normal limits





- Abdominal


General gastrointestinal: Present: soft, non-tender, non-distended, normal 

bowel sounds


Female genitourinary: Present: normal





- Integumentary


Integumentary: Present: clear, warm, dry





- Musculoskeletal


Musculoskeletal: gait normal, strength equal bilaterally





- Psychiatric


Psychiatric: appropriate mood/affect, intact judgment & insight





- Neurologic


Neurologic: CNII-XII intact, moves all extremities





Results





- Labs


CBC & Chem 7: 


 08/21/18 12:24





 08/21/18 12:24


Labs: 


 Abnormal lab results











  08/21/18 08/21/18 08/21/18 Range/Units





  12:24 12:24 12:24 


 


Hgb   15.2 H   (10.1-14.3)  gm/dl


 


Hct   44.9 H   (30.3-42.9)  %


 


Mono % (Auto)   9.3 H   (0.0-7.3)  %


 


Potassium  3.5 L    (3.6-5.0)  mmol/L


 


BUN  6 L    (7-17)  mg/dL


 


Troponin T    0.039 H  (0.00-0.029)  ng/mL














Assessment and Plan





- Patient Problems


(1) ACS (acute coronary syndrome)


Current Visit: Yes   Status: Acute   


Plan to address problem: 


Admit to telemetry, 








(2) Respiratory failure


Current Visit: Yes   Status: Acute   


Qualifiers: 


   Chronicity: acute   Respiratory failure complication: hypoxia   Qualified 

Code(s): J96.01 - Acute respiratory failure with hypoxia   


Plan to address problem: 


Supplemental oxygen, nebulizer therapy, NIPPV as clinically indicated, Chest x 

ray. 








(3) Breast cancer


Current Visit: Yes   Status: Acute   


Qualifiers: 


   Laterality: unspecified laterality 


Plan to address problem: 


supportive care, outpatient oncology f/U








(4) COPD exacerbation


Current Visit: Yes   Status: Acute   


Plan to address problem: 


IV steroid therapy, supplemental oxygen, ABG, NIPPV as clinically indicated. 








(5) Hypertension


Current Visit: No   Status: Chronic   


Qualifiers: 


   Hypertension type: essential hypertension   Qualified Code(s): I10 - 

Essential (primary) hypertension   


Plan to address problem: 


monitor bp q shift,








(6) Diastolic CHF


Current Visit: Yes   Status: Acute   


Qualifiers: 


   Heart failure chronicity: acute   Qualified Code(s): I50.31 - Acute 

diastolic (congestive) heart failure   


Plan to address problem: 


Admit to telemetry, cardiology consulted in ED, Echo, D dimer, BNP, strict I/O, 

monitor uop q shift, 








(7) DVT prophylaxis


Current Visit: No   Status: Acute   


Plan to address problem: 


SCD to BLE while in bed.

## 2018-08-21 NOTE — XRAY REPORT
AP CHEST:



HISTORY: Dyspnea



The lungs are hyperinflated suggesting moderate underlying emphysema. 

No evidence for pneumonia, mass, pleural effusion or pneumothorax. 

Normal heart and mediastinal structures. Normal bony structures.



IMPRESSION:

Hyperinflated lungs. No acute process.

## 2018-08-22 RX ADMIN — Medication SCH ML: at 11:26

## 2018-08-22 RX ADMIN — Medication SCH ML: at 21:19

## 2018-08-22 RX ADMIN — ONDANSETRON PRN MG: 2 INJECTION INTRAMUSCULAR; INTRAVENOUS at 18:43

## 2018-08-22 RX ADMIN — LORATADINE SCH MG: 10 TABLET ORAL at 11:25

## 2018-08-22 RX ADMIN — AMLODIPINE BESYLATE SCH MG: 10 TABLET ORAL at 11:36

## 2018-08-22 RX ADMIN — ARFORMOTEROL TARTRATE SCH MCG: 15 SOLUTION RESPIRATORY (INHALATION) at 21:09

## 2018-08-22 RX ADMIN — BUDESONIDE SCH MG: 0.5 INHALANT RESPIRATORY (INHALATION) at 21:09

## 2018-08-22 RX ADMIN — FAMOTIDINE SCH MG: 20 TABLET ORAL at 21:19

## 2018-08-22 RX ADMIN — BUDESONIDE SCH MG: 0.5 INHALANT RESPIRATORY (INHALATION) at 10:23

## 2018-08-22 RX ADMIN — ONDANSETRON PRN MG: 2 INJECTION INTRAMUSCULAR; INTRAVENOUS at 12:27

## 2018-08-22 RX ADMIN — FAMOTIDINE SCH MG: 20 TABLET ORAL at 11:25

## 2018-08-22 RX ADMIN — ARFORMOTEROL TARTRATE SCH MCG: 15 SOLUTION RESPIRATORY (INHALATION) at 10:23

## 2018-08-22 NOTE — CONSULTATION
History of Present Illness


Consult date: 08/22/18


Reason for consult: dyspnea, chest pain, COPD, other (fumes inhalation)


History of present illness: 








63 YO Female with HTN, Breast Cancer, COPD, Asthma presents to ED for 

evaluation.  On admission, the patient stated that " she has experienced pain 

her chest and shortness of breath over the past 1 week. Pt states that her pain 

is episodic in nature and lasts for several minutes at a time. Pt states that 

pain is 6/10, substernal, nonradiating, not worsened with exertion, relieved 

with rest, associated with shortness of breath, crushing in nature. Pt denies 

fever, chills,palpitatons, NVD, syncope, BRBPR, Unintentional weight loss, 

night sweats, bone pain, or recent ill contacts. Pt states that her breathing 

got progressively worse after she accidentally inhaled fumes (on Saturday, but 

symptoms started Sunday morning), while cleaning her oven with oven .  

Noted to be wheezing earlier.  Pulmonary called for evaluation of COPD








Past History


Past Medical History: cancer, COPD, hypertension


Past Surgical History: Other (Right Knee surgery, )


Social history: single.  denies: smoking, alcohol abuse, prescription drug abuse


Family history: hypertension





Medications and Allergies


 Allergies











Allergy/AdvReac Type Severity Reaction Status Date / Time


 


No Known Allergies Allergy   Unverified 06/17/15 10:03











 Home Medications











 Medication  Instructions  Recorded  Confirmed  Last Taken  Type


 


Albuterol Sulfate [Ventolin HFA] 2 puff IH Q6H PRN 08/21/18 08/21/18 Unknown 

History


 


Amlodipine Besylate [Norvasc] 5 mg PO DAILY 08/21/18 08/21/18 Unknown History


 


Budesonide/Formoterol Fumarate 2 puff IH BID 08/21/18 08/21/18 Unknown History





[Symbicort 160-4.5 Mcg Inhaler]     


 


Cetirizine HCl [Zyrtec] 10 mg PO QDAY 08/21/18 08/21/18 08/21/18 History











Active Meds: 


Active Medications





Acetaminophen (Tylenol)  650 mg PO Q4H PRN


   PRN Reason: Pain MILD(1-3)/Fever >100.5/HA


   Last Admin: 08/22/18 07:34 Dose:  650 mg


Albuterol (Proventil)  2.5 mg IH Q4HRT PRN


   PRN Reason: Shortness Of Breath


   Last Admin: 08/22/18 10:27 Dose:  2.5 mg


Amlodipine Besylate (Norvasc)  10 mg PO DAILY [facility]


   Last Admin: 08/22/18 11:36 Dose:  10 mg


Arformoterol Tartrate (Brovana Nebu)  15 mcg IH Q12HRT [facility]


   Last Admin: 08/22/18 10:23 Dose:  15 mcg


Budesonide (Pulmicort)  0.5 mg IH Q12HRT [facility]


   Last Admin: 08/22/18 10:23 Dose:  0.5 mg


Famotidine (Pepcid)  20 mg PO BID [facility]


   Last Admin: 08/22/18 11:25 Dose:  20 mg


Hydralazine HCl (Apresoline)  10 mg IV Q4HR PRN


   PRN Reason: Hypertension


   Last Admin: 08/22/18 11:23 Dose:  10 mg


Loratadine (Claritin)  10 mg PO DAILY [facility]


   Last Admin: 08/22/18 11:25 Dose:  10 mg


Methylprednisolone Sodium Succinate (Solu-Medrol)  80 mg IV Q8HR [facility]


   Last Admin: 08/22/18 11:26 Dose:  80 mg


Nitroglycerin (Nitrostat)  0.4 mg SL Q5M PRN


   PRN Reason: Chest Pain


Ondansetron HCl (Zofran)  4 mg IV Q8H PRN


   PRN Reason: Nausea And Vomiting


Sodium Chloride (Sodium Chloride Flush Syringe 10 Ml)  10 ml IV BID [facility]


   Last Admin: 08/22/18 11:26 Dose:  10 ml


Sodium Chloride (Sodium Chloride Flush Syringe 10 Ml)  10 ml IV PRN PRN


   PRN Reason: LINE FLUSH


Sodium Chloride (Sodium Chloride Flush Syringe 10 Ml)  10 ml IV PRN PRN


   PRN Reason: LINE FLUSH











Review of Systems


Constitutional: no weight loss, no weight gain


Eyes: bilateral: other (no eye irritation)


Ears, nose, mouth and throat: no ear pain, no ear discharge, no tinnitis


Breasts: deferred


Cardiovascular: other (see HPI)


Respiratory: other (see HPI)


Gastrointestinal: no abdominal pain, no nausea, no vomiting, no diarrhea, no 

constipation


Musculoskeletal: no neck stiffness, no neck pain, no shooting arm pain, no arm 

numbness/tingling


Integumentary: no rash, no pruritis, no redness, no sores


Neurological: no head injury, no transient paralysis, no paralysis, no weakness


Psychiatric: sleep disturbances, insomnia, no anxiety, no memory loss, no 

change in sleep habits


Hematologic/Lymphatic: no easy bruising, no easy bleeding, no lymphadenopathy, 

no lymphedema





Physical Examination


Vital signs: 


 Vital Signs











Temp Pulse Resp BP Pulse Ox


 


 98.6 F   88   36 H  180/106   98 


 


 08/21/18 11:25  08/21/18 11:25  08/21/18 11:25  08/21/18 11:25  08/21/18 11:25











General appearance: no acute distress, alert, asleep


Eyes: non-icteric


ENT: oropharynx moist


Neck: no JVD


Effort: normal


Ascultation: Bilateral: clear, diminished breath sounds


Percussion: Bilateral: not dull


Tactile fremitus: Bilateral: normal


Cardiovascular: regular rate and rhythm


Gastrointestinal: normoactive bowel sounds, non-distended


Integumentary: normal


Extremities: no cyanosis, no edema


normal mental status, non-focal exam, CN II-XII normal, motor strength normal 

and


mood appropriate, affect normal





Results





- Laboratory Findings


CBC and BMP: 


 08/21/18 12:24





 08/21/18 12:24


PT/INR, D-dimer











PT  12.8 Sec. (12.2-14.9)   08/21/18  12:24    


 


INR  0.92  (0.87-1.13)   08/21/18  12:24    


 


D-Dimer  221.88 ng/mlDDU (0-234)   08/21/18  12:24    








Abnormal lab findings: 


 Abnormal Labs











  08/21/18 08/21/18 08/21/18





  12:24 12:24 12:24


 


Hgb   15.2 H 


 


Hct   44.9 H 


 


Mono % (Auto)   9.3 H 


 


Potassium  3.5 L  


 


BUN  6 L  


 


Troponin T    0.039 H


 


Cholesterol    213 H


 


LDL Cholesterol Direct    149 H


 


HDL Cholesterol    60 H














  08/21/18 08/21/18





  16:01 19:47


 


Hgb  


 


Hct  


 


Mono % (Auto)  


 


Potassium  


 


BUN  


 


Troponin T  0.041 H  0.042 H


 


Cholesterol  


 


LDL Cholesterol Direct  


 


HDL Cholesterol  














- Diagnostic Findings


Chest x-ray: report reviewed, image reviewed





Assessment and Plan





COPD exacerbation.  Possibly triggered by a combination of factors including 

recent exposure to inhaled irritants.  Appears to be improving at this point


Chest pain.  May be related to the above.  Currently under cardiac workup since 

cardiac enzymes are elevated


Chemical fume inhalation.  Oven  exposure


Hyperlipidemia








Recommendations





Albuterol 2.5 milligram nebulizations every 4-6 hours with or without 

ipratropium


Solu-Medrol 40-60 mg IV every 6-8 hours if persistent wheezing


Oxygen support via nasal cannula or mask to maintain oximetry over 92%


Once controlled, she can go back to her home Symbicort, 2 inhalations twice a 

day plus prednisone 30-40 mg per day with seven-day taper down


Discussed problem and possible chemical exposure with patient


Currently not an active smoker


Follow-up cardiology evaluation for chest pain and CAD


DVT prophylaxis











Thanks

## 2018-08-22 NOTE — CONSULTATION
History of Present Illness


Consult date: 08/22/18


Consult reason: chest pain


History of present illness: 





This is a 62 year old woman who presented with shortness of breath and 

wheezing. She is a former smoker and gives a history of Asthma and COPD but is 

not on home oxygen. A chest xray reports hyperinflated lungs suggesting 

underlying emphysema. 





A cardiac consultation was requested for mild elevated troponin. Patient denies 

chest pain. She denies palpitations. There was no report of syncope. Patient 

denies a prior cardiac history and she denies prior ischemic cardiac 

evaluation. An echocardiogram this presentation shows a normal left ventricular 

systolic function, ejection fraction 50-55%. 12 lead EKG is a sinus rhythm, no 

acute ischemic changes.





Past History


Past Medical History: cancer, COPD, hypertension


Social history: single.  denies: smoking, alcohol abuse, prescription drug abuse


Family history: hypertension





Medications and Allergies


 Allergies











Allergy/AdvReac Type Severity Reaction Status Date / Time


 


No Known Allergies Allergy   Unverified 06/17/15 10:03











 Home Medications











 Medication  Instructions  Recorded  Confirmed  Last Taken  Type


 


Albuterol Sulfate [Ventolin HFA] 2 puff IH Q6H PRN 08/21/18 08/21/18 Unknown 

History


 


Amlodipine Besylate [Norvasc] 5 mg PO DAILY 08/21/18 08/21/18 Unknown History


 


Budesonide/Formoterol Fumarate 2 puff IH BID 08/21/18 08/21/18 Unknown History





[Symbicort 160-4.5 Mcg Inhaler]     


 


Cetirizine HCl [Zyrtec] 10 mg PO QDAY 08/21/18 08/21/18 08/21/18 History











Active Meds: 


Active Medications





Acetaminophen (Tylenol)  650 mg PO Q4H PRN


   PRN Reason: Pain MILD(1-3)/Fever >100.5/HA


   Last Admin: 08/22/18 07:34 Dose:  650 mg


Albuterol (Proventil)  2.5 mg IH Q4HRT PRN


   PRN Reason: Shortness Of Breath


Amlodipine Besylate (Norvasc)  5 mg PO DAILY [facility]


   Last Admin: 08/21/18 17:32 Dose:  Not Given


Arformoterol Tartrate (Brovana Nebu)  15 mcg IH Q12HRT [facility]


   Last Admin: 08/21/18 20:12 Dose:  15 mcg


Budesonide (Pulmicort)  0.5 mg IH Q12HRT [facility]


   Last Admin: 08/21/18 20:12 Dose:  0.5 mg


Famotidine (Pepcid)  20 mg PO BID [facility]


   Last Admin: 08/21/18 21:36 Dose:  20 mg


Hydralazine HCl (Apresoline)  5 mg IV Q6HR PRN


   PRN Reason: Hypertension


   Last Admin: 08/22/18 06:45 Dose:  5 mg


Loratadine (Claritin)  10 mg PO DAILY [facility]


Nitroglycerin (Nitrostat)  0.4 mg SL Q5M PRN


   PRN Reason: Chest Pain


Ondansetron HCl (Zofran)  4 mg IV Q8H PRN


   PRN Reason: Nausea And Vomiting


Sodium Chloride (Sodium Chloride Flush Syringe 10 Ml)  10 ml IV BID [facility]


   Last Admin: 08/21/18 21:37 Dose:  10 ml


Sodium Chloride (Sodium Chloride Flush Syringe 10 Ml)  10 ml IV PRN PRN


   PRN Reason: LINE FLUSH


Sodium Chloride (Sodium Chloride Flush Syringe 10 Ml)  10 ml IV PRN PRN


   PRN Reason: LINE FLUSH











Physical Examination


 Vital Signs











Temp Pulse Resp BP Pulse Ox


 


 98.6 F   88   36 H  180/106   98 


 


 08/21/18 11:25  08/21/18 11:25  08/21/18 11:25  08/21/18 11:25  08/21/18 11:25











General appearance: no acute distress


HEENT: Positive: PERRL


Cardiac: Positive: Reg Rate and Rhythm


Lungs: Positive: Wheezes


Neuro: Positive: Grossly Intact


Extremities: Absent: edema





Results





 08/21/18 12:24





 08/21/18 12:24


 Cardiac Enzymes











  08/21/18 Range/Units





  12:24 


 


AST  17  (5-40)  units/L








 Coagulation











  08/21/18 Range/Units





  12:24 


 


PT  12.8  (12.2-14.9)  Sec.


 


INR  0.92  (0.87-1.13)  


 


APTT  28.6  (24.2-36.6)  Sec.








 Lipids











  08/21/18 Range/Units





  12:24 


 


Triglycerides  118  (2-149)  mg/dL


 


Cholesterol  213 H  ()  mg/dL


 


HDL Cholesterol  60 H  (40-59)  mg/dL


 


Cholesterol/HDL Ratio  3.55  %








 CBC











  08/21/18 Range/Units





  12:24 


 


WBC  7.6  (4.5-11.0)  K/mm3


 


RBC  4.85  (3.65-5.03)  M/mm3


 


Hgb  15.2 H  (10.1-14.3)  gm/dl


 


Hct  44.9 H  (30.3-42.9)  %


 


Plt Count  310  (140-440)  K/mm3


 


Lymph #  2.4  (1.2-5.4)  K/mm3


 


Mono #  0.7  (0.0-0.8)  K/mm3


 


Eos #  0.3  (0.0-0.4)  K/mm3


 


Baso #  0.0  (0.0-0.1)  K/mm3








 Comprehensive Metabolic Panel











  08/21/18 Range/Units





  12:24 


 


Sodium  140  (137-145)  mmol/L


 


Potassium  3.5 L  (3.6-5.0)  mmol/L


 


Chloride  100.6  ()  mmol/L


 


Carbon Dioxide  26  (22-30)  mmol/L


 


BUN  6 L  (7-17)  mg/dL


 


Creatinine  0.8  (0.7-1.2)  mg/dL


 


Glucose  91  ()  mg/dL


 


Calcium  9.4  (8.4-10.2)  mg/dL


 


AST  17  (5-40)  units/L


 


ALT  12  (7-56)  units/L


 


Alkaline Phosphatase  57  ()  units/L


 


Total Protein  7.2  (6.3-8.2)  g/dL


 


Albumin  4.1  (3.9-5)  g/dL














Assessment and Plan





COPD exacerbation


Hypertension


Elevated troponin, nonspecific





An echocardiogram this presentation shows a normal left ventricular systolic 

function, ejection fraction 50-55%. 





Recommend:


Pulmonary assessment of shortness of breath, COPD or asthma.

## 2018-08-22 NOTE — CONSULTATION
History of Present Illness


Consult date: 08/22/18


Consult reason: shortness of breath


History of present illness: 





Patient is presenting with shortness of breath and wheezing to the ER after 

exposing herself to detergent fumes while cleaning her oven at home


Patient denies past history of cardiac illness. She reports history of asthma, 

COPD and former smoking. ECG showing non-specific lateral T wave abnormalities 

and troponins are mildly but non-specifically elevated. She denies chest pain. 

A stress test was ordered for today but will be cancelled due to ongoing active 

wheezing and shortness of breath





Past History


Past Medical History: cancer, COPD, hypertension


Past Surgical History: Other (Right Knee surgery, )


Social history: single.  denies: smoking, alcohol abuse, prescription drug abuse


Family history: hypertension





Medications and Allergies


 Allergies











Allergy/AdvReac Type Severity Reaction Status Date / Time


 


No Known Allergies Allergy   Unverified 06/17/15 10:03











 Home Medications











 Medication  Instructions  Recorded  Confirmed  Last Taken  Type


 


Albuterol Sulfate [Ventolin HFA] 2 puff IH Q6H PRN 08/21/18 08/21/18 Unknown 

History


 


Amlodipine Besylate [Norvasc] 5 mg PO DAILY 08/21/18 08/21/18 Unknown History


 


Budesonide/Formoterol Fumarate 2 puff IH BID 08/21/18 08/21/18 Unknown History





[Symbicort 160-4.5 Mcg Inhaler]     


 


Cetirizine HCl [Zyrtec] 10 mg PO QDAY 08/21/18 08/21/18 08/21/18 History











Active Meds: 


Active Medications





Acetaminophen (Tylenol)  650 mg PO Q4H PRN


   PRN Reason: Pain MILD(1-3)/Fever >100.5/HA


   Last Admin: 08/22/18 07:34 Dose:  650 mg


Albuterol (Proventil)  2.5 mg IH Q4HRT PRN


   PRN Reason: Shortness Of Breath


Amlodipine Besylate (Norvasc)  5 mg PO DAILY [facility]


   Last Admin: 08/21/18 17:32 Dose:  Not Given


Arformoterol Tartrate (Brovana Nebu)  15 mcg IH Q12HRT [facility]


   Last Admin: 08/21/18 20:12 Dose:  15 mcg


Budesonide (Pulmicort)  0.5 mg IH Q12HRT [facility]


   Last Admin: 08/21/18 20:12 Dose:  0.5 mg


Famotidine (Pepcid)  20 mg PO BID [facility]


   Last Admin: 08/21/18 21:36 Dose:  20 mg


Hydralazine HCl (Apresoline)  5 mg IV Q6HR PRN


   PRN Reason: Hypertension


   Last Admin: 08/22/18 06:45 Dose:  5 mg


Loratadine (Claritin)  10 mg PO DAILY [facility]


Nitroglycerin (Nitrostat)  0.4 mg SL Q5M PRN


   PRN Reason: Chest Pain


Ondansetron HCl (Zofran)  4 mg IV Q8H PRN


   PRN Reason: Nausea And Vomiting


Sodium Chloride (Sodium Chloride Flush Syringe 10 Ml)  10 ml IV BID [facility]


   Last Admin: 08/21/18 21:37 Dose:  10 ml


Sodium Chloride (Sodium Chloride Flush Syringe 10 Ml)  10 ml IV PRN PRN


   PRN Reason: LINE FLUSH


Sodium Chloride (Sodium Chloride Flush Syringe 10 Ml)  10 ml IV PRN PRN


   PRN Reason: LINE FLUSH











Review of Systems


All systems: negative





Physical Examination


 Vital Signs











Temp Pulse Resp BP Pulse Ox


 


 98.6 F   88   36 H  180/106   98 


 


 08/21/18 11:25  08/21/18 11:25  08/21/18 11:25  08/21/18 11:25  08/21/18 11:25











General appearance: no acute distress


HEENT: Positive: PERRL


Neck: Negative: JVD/HJR


Cardiac: Positive: Reg Rate and Rhythm


Lungs: Positive: Decreased Breath Sounds, Wheezes, Rhonchi


Abdomen: Positive: Soft


Extremities: Absent: edema





Results





 08/21/18 12:24





 08/21/18 12:24


 Cardiac Enzymes











  08/21/18 Range/Units





  12:24 


 


AST  17  (5-40)  units/L








 Coagulation











  08/21/18 Range/Units





  12:24 


 


PT  12.8  (12.2-14.9)  Sec.


 


INR  0.92  (0.87-1.13)  


 


APTT  28.6  (24.2-36.6)  Sec.








 Lipids











  08/21/18 Range/Units





  12:24 


 


Triglycerides  118  (2-149)  mg/dL


 


Cholesterol  213 H  ()  mg/dL


 


HDL Cholesterol  60 H  (40-59)  mg/dL


 


Cholesterol/HDL Ratio  3.55  %








 CBC











  08/21/18 Range/Units





  12:24 


 


WBC  7.6  (4.5-11.0)  K/mm3


 


RBC  4.85  (3.65-5.03)  M/mm3


 


Hgb  15.2 H  (10.1-14.3)  gm/dl


 


Hct  44.9 H  (30.3-42.9)  %


 


Plt Count  310  (140-440)  K/mm3


 


Lymph #  2.4  (1.2-5.4)  K/mm3


 


Mono #  0.7  (0.0-0.8)  K/mm3


 


Eos #  0.3  (0.0-0.4)  K/mm3


 


Baso #  0.0  (0.0-0.1)  K/mm3








 Comprehensive Metabolic Panel











  08/21/18 Range/Units





  12:24 


 


Sodium  140  (137-145)  mmol/L


 


Potassium  3.5 L  (3.6-5.0)  mmol/L


 


Chloride  100.6  ()  mmol/L


 


Carbon Dioxide  26  (22-30)  mmol/L


 


BUN  6 L  (7-17)  mg/dL


 


Creatinine  0.8  (0.7-1.2)  mg/dL


 


Glucose  91  ()  mg/dL


 


Calcium  9.4  (8.4-10.2)  mg/dL


 


AST  17  (5-40)  units/L


 


ALT  12  (7-56)  units/L


 


Alkaline Phosphatase  57  ()  units/L


 


Total Protein  7.2  (6.3-8.2)  g/dL


 


Albumin  4.1  (3.9-5)  g/dL














- EKG Interpretation


EKG: sinus rhythm





EKG interpretations





- Telemetry


EKG Rhythm: Sinus Rhythm





Assessment and Plan





Shortness of breath


   Asthma/COPD exacerbation


Non-specific troponin


   Normal LVEF by echo


   Non-specific lateral T wave abnormalities on 2D echo


Systemic Hypertension


   /106 on admission


History of breast cancer


   Negative D-Dimer this admission





Recommendations:





Patient is not ready for stress test today due to active wheezing on exam


Reschedule stress testing when wheezing and COPD exacerbation resolve

## 2018-08-22 NOTE — CAT SCAN REPORT
FINAL REPORT



EXAM:  CT HEAD/BRAIN WO CON



HISTORY:  severe headaches 



TECHNIQUE:  2.5 millimeter axial images from the skullbase to the

vertex.



Comparison: None 



FINDINGS:  

There is no evidence of an acute intracranial process,

intracranial hemorrhage or mass effect.



The ventricles are normal size.



The visualized portions of the orbits, paranasal and mastoid

sinuses are notable for a bony defect in the posterior lateral

aspect of the right mastoid sinus that is contiguous with the

right sigmoid sinus. Whether not this contains a vascular

structure is unclear. 



IMPRESSION:  

1. No evidence of an acute intracranial process, intracranial

hemorrhage or mass effect.



If there is a clinical suspicion of an acute intracranial

process, MRI brain may be helpful.



2. Incidental note is made of a bony defect in the posterior

lateral aspect of the right mastoid sinus that is contiguous with

the right sigmoid sinus. Whether or not this contains a venous

vascular structure is unclear. Comparison with previous imaging

studies would be helpful.

## 2018-08-22 NOTE — PROGRESS NOTE
Assessment and Plan


Assessment and plan: 


63 YO Female with HTN, Breast Cancer, COPD, Asthma presents to ED for 

evaluation. Pt states that she has experienced pain her chest and shortness of 

breath over the past 1 week. Pt states that her pain is episodic in nature and 

lasts for several minutes at a time. Pt states that pain is 6/10, substernal, 

nonradiating, not worsened with exertion, relieved with rest, associated with 

shortness of breath, crushing in nature. Pt denies fever, chills,palpitatons, 

NVD, syncope, BRBPR, Unintentional weight loss, night sweats, bone pain, or 

recent ill contacts. Pt states that her breathing got progressively worse after 

she accidentally inhaled fumes while cleaning her oven with oven . Pt 

seen and evaluated in ED and found to have COPD Exacerbation, Acute Respiratory 

Failure, ACS and Diastolic CHF.  Pt admitted to telemetry. Cardiology consulted 

in ED. 





(1) Atypical chest pain


Cardiology consulted and following.








(2) Respiratory failure


Current Visit: Yes   Status: Acute   


Qualifiers: 


   Chronicity: acute   Respiratory failure complication: hypoxia   Qualified 

Code(s): J96.01 - Acute respiratory failure with hypoxia   


Plan to address problem: 


Supplemental oxygen, nebulizer therapy, NIPPV as clinically indicated, Chest x 

ray. 








(3) Breast cancer


Current Visit: Yes   Status: Acute   


Qualifiers: 


   Laterality: unspecified laterality 


Plan to address problem: 


supportive care, outpatient oncology f/U








(4) COPD exacerbation


Current Visit: Yes   Status: Acute   


Plan to address problem: 


IV steroid therapy, supplemental oxygen, ABG, NIPPV as clinically indicated. 








(5) Hypertension


Current Visit: No   Status: Chronic   


Qualifiers: 


   Hypertension type: essential hypertension   Qualified Code(s): I10 - 

Essential (primary) hypertension   


Plan to address problem: 


monitor bp q shift,








(6) Diastolic CHF


Current Visit: Yes   Status: Acute   


Qualifiers: 


   Heart failure chronicity: acute   Qualified Code(s): I50.31 - Acute 

diastolic (congestive) heart failure   


Plan to address problem: 


Admit to telemetry, cardiology consulted in ED, Echo, D dimer, BNP, strict I/O, 

monitor uop q shift, 








(7) DVT prophylaxis


Current Visit: No   Status: Acute   


Plan to address problem: 


SCD to BLE while in bed. 











History


Interval history: 





PATIENT SEEN AND EXAMINE. STILL WITH SHORTNESS OF BREATH





Hospitalist Physical





- Physical exam


Narrative exam: 


General appearance: Present: no acute distress, well-nourished





- EENT


Eyes: Present: PERRL


ENT: hearing intact, clear oral mucosa





- Neck


Neck: Present: supple, normal ROM





- Respiratory


Respiratory effort: normal


Respiratory: bilateral: CTA





- Cardiovascular


Heart Sounds: Present: S1 & S2.  Absent: rub, click





- Extremities


Extremities: pulses symmetrical, No edema


Peripheral Pulses: within normal limits





- Abdominal


General gastrointestinal: Present: soft, non-tender, non-distended, normal 

bowel sounds


Female genitourinary: Present: normal





- Integumentary


Integumentary: Present: clear, warm, dry





- Musculoskeletal


Musculoskeletal: gait normal, strength equal bilaterally





- Psychiatric


Psychiatric: appropriate mood/affect, intact judgment & insight





- Neurologic


Neurologic: CNII-XII intact, moves all extremities











- Constitutional


Vitals: 


 











Temp Pulse Resp BP Pulse Ox


 


 97.9 F   70   20   138/85   98 


 


 08/22/18 12:14  08/22/18 12:14  08/22/18 12:14  08/22/18 12:14  08/22/18 12:14











General appearance: Present: no acute distress





Results





- Labs


CBC & Chem 7: 


 08/21/18 12:24





 08/21/18 12:24


Labs: 


 Laboratory Last Values











WBC  7.6 K/mm3 (4.5-11.0)   08/21/18  12:24    


 


RBC  4.85 M/mm3 (3.65-5.03)   08/21/18  12:24    


 


Hgb  15.2 gm/dl (10.1-14.3)  H  08/21/18  12:24    


 


Hct  44.9 % (30.3-42.9)  H  08/21/18  12:24    


 


MCV  93 fl (79-97)   08/21/18  12:24    


 


MCH  31 pg (28-32)   08/21/18  12:24    


 


MCHC  34 % (30-34)   08/21/18  12:24    


 


RDW  13.7 % (13.2-15.2)   08/21/18  12:24    


 


Plt Count  310 K/mm3 (140-440)   08/21/18  12:24    


 


Lymph % (Auto)  32.2 % (13.4-35.0)   08/21/18  12:24    


 


Mono % (Auto)  9.3 % (0.0-7.3)  H  08/21/18  12:24    


 


Eos % (Auto)  4.1 % (0.0-4.3)   08/21/18  12:24    


 


Baso % (Auto)  0.6 % (0.0-1.8)   08/21/18  12:24    


 


Lymph #  2.4 K/mm3 (1.2-5.4)   08/21/18  12:24    


 


Mono #  0.7 K/mm3 (0.0-0.8)   08/21/18  12:24    


 


Eos #  0.3 K/mm3 (0.0-0.4)   08/21/18  12:24    


 


Baso #  0.0 K/mm3 (0.0-0.1)   08/21/18  12:24    


 


Seg Neutrophils %  53.8 % (40.0-70.0)   08/21/18  12:24    


 


Seg Neutrophils #  4.1 K/mm3 (1.8-7.7)   08/21/18  12:24    


 


PT  12.8 Sec. (12.2-14.9)   08/21/18  12:24    


 


INR  0.92  (0.87-1.13)   08/21/18  12:24    


 


APTT  28.6 Sec. (24.2-36.6)   08/21/18  12:24    


 


D-Dimer  221.88 ng/mlDDU (0-234)   08/21/18  12:24    


 


Sodium  140 mmol/L (137-145)   08/21/18  12:24    


 


Potassium  3.5 mmol/L (3.6-5.0)  L  08/21/18  12:24    


 


Chloride  100.6 mmol/L ()   08/21/18  12:24    


 


Carbon Dioxide  26 mmol/L (22-30)   08/21/18  12:24    


 


Anion Gap  17 mmol/L  08/21/18  12:24    


 


BUN  6 mg/dL (7-17)  L  08/21/18  12:24    


 


Creatinine  0.8 mg/dL (0.7-1.2)   08/21/18  12:24    


 


Estimated GFR  > 60 ml/min  08/21/18  12:24    


 


BUN/Creatinine Ratio  8 %  08/21/18  12:24    


 


Glucose  91 mg/dL ()   08/21/18  12:24    


 


Calcium  9.4 mg/dL (8.4-10.2)   08/21/18  12:24    


 


Total Bilirubin  0.40 mg/dL (0.1-1.2)   08/21/18  12:24    


 


AST  17 units/L (5-40)   08/21/18  12:24    


 


ALT  12 units/L (7-56)   08/21/18  12:24    


 


Alkaline Phosphatase  57 units/L ()   08/21/18  12:24    


 


Troponin T  0.042 ng/mL (0.00-0.029)  H  08/21/18  19:47    


 


Total Protein  7.2 g/dL (6.3-8.2)   08/21/18  12:24    


 


Albumin  4.1 g/dL (3.9-5)   08/21/18  12:24    


 


Albumin/Globulin Ratio  1.3 %  08/21/18  12:24    


 


Triglycerides  118 mg/dL (2-149)   08/21/18  12:24    


 


Cholesterol  213 mg/dL ()  H  08/21/18  12:24    


 


LDL Cholesterol Direct  149 mg/dL ()  H  08/21/18  12:24    


 


HDL Cholesterol  60 mg/dL (40-59)  H  08/21/18  12:24    


 


Cholesterol/HDL Ratio  3.55 %  08/21/18  12:24

## 2018-08-23 RX ADMIN — Medication SCH ML: at 21:57

## 2018-08-23 RX ADMIN — FAMOTIDINE SCH MG: 20 TABLET ORAL at 11:24

## 2018-08-23 RX ADMIN — AMLODIPINE BESYLATE SCH: 5 TABLET ORAL at 20:22

## 2018-08-23 RX ADMIN — ARFORMOTEROL TARTRATE SCH MCG: 15 SOLUTION RESPIRATORY (INHALATION) at 09:06

## 2018-08-23 RX ADMIN — FAMOTIDINE SCH MG: 20 TABLET ORAL at 21:57

## 2018-08-23 RX ADMIN — BUDESONIDE SCH MG: 0.5 INHALANT RESPIRATORY (INHALATION) at 09:06

## 2018-08-23 RX ADMIN — PREDNISONE SCH MG: 20 TABLET ORAL at 11:23

## 2018-08-23 RX ADMIN — BUDESONIDE SCH MG: 0.5 INHALANT RESPIRATORY (INHALATION) at 21:45

## 2018-08-23 RX ADMIN — AMLODIPINE BESYLATE SCH MG: 10 TABLET ORAL at 11:23

## 2018-08-23 RX ADMIN — Medication SCH: at 20:21

## 2018-08-23 RX ADMIN — LORATADINE SCH MG: 10 TABLET ORAL at 11:23

## 2018-08-23 RX ADMIN — ARFORMOTEROL TARTRATE SCH MCG: 15 SOLUTION RESPIRATORY (INHALATION) at 21:45

## 2018-08-23 NOTE — PROGRESS NOTE
Assessment and Plan


Assessment and plan: 


63 YO Female with HTN, Breast Cancer, COPD, Asthma presents to ED for 

evaluation. Pt states that she has experienced pain her chest and shortness of 

breath over the past 1 week. Pt states that her pain is episodic in nature and 

lasts for several minutes at a time. Pt states that pain is 6/10, substernal, 

nonradiating, not worsened with exertion, relieved with rest, associated with 

shortness of breath, crushing in nature. Pt denies fever, chills,palpitatons, 

NVD, syncope, BRBPR, Unintentional weight loss, night sweats, bone pain, or 

recent ill contacts. Pt states that her breathing got progressively worse after 

she accidentally inhaled fumes while cleaning her oven with oven . Pt 

seen and evaluated in ED and found to have COPD Exacerbation, Acute Respiratory 

Failure, ACS and Diastolic CHF.  Pt admitted to telemetry. Cardiology consulted 

in ED. 





(1) Atypical chest pain


Cardiology consulted and following.


STRESS TEST WAS HELD DUE TO WHEEZING. Now improved, will discuss with 

cardiology for stress test in am








(2) Respiratory failure


Current Visit: Yes   Status: Acute   


Qualifiers: 


   Chronicity: acute   Respiratory failure complication: hypoxia   Qualified 

Code(s): J96.01 - Acute respiratory failure with hypoxia   


Plan to address problem: 


Supplemental oxygen, nebulizer therapy, NIPPV as clinically indicated, Chest x 

ray. 








(3) Breast cancer


Current Visit: Yes   Status: Acute   


Qualifiers: 


   Laterality: unspecified laterality 


Plan to address problem: 


supportive care, outpatient oncology f/U








(4) COPD exacerbation


Current Visit: Yes   Status: Acute   


Plan to address problem: 


IV steroid therapy, supplemental oxygen, ABG, NIPPV as clinically indicated. 








(5) Hypertension


Current Visit: No   Status: Chronic   


Qualifiers: 


   Hypertension type: essential hypertension   Qualified Code(s): I10 - 

Essential (primary) hypertension   


Plan to address problem: 


monitor bp q shift,








(6) Diastolic CHF


Current Visit: Yes   Status: Acute   


Qualifiers: 


   Heart failure chronicity: acute   Qualified Code(s): I50.31 - Acute 

diastolic (congestive) heart failure   


Plan to address problem: 


Admit to telemetry, cardiology consulted in ED, Echo, D dimer, BNP, strict I/O, 

monitor uop q shift, 








(7) DVT prophylaxis


Current Visit: No   Status: Acute   


Plan to address problem: 


SCD to BLE while in bed. 











History


Interval history: 





PATIENT SEEN AND EXAMINE. Reports remarkbale improvement. Plan for stress test 

in AM





Hospitalist Physical





- Physical exam


Narrative exam: 


General appearance: Present: no acute distress, well-nourished





- EENT


Eyes: Present: PERRL


ENT: hearing intact, clear oral mucosa





- Neck


Neck: Present: supple, normal ROM





- Respiratory


Respiratory effort: normal


Respiratory: bilateral: CTA





- Cardiovascular


Heart Sounds: Present: S1 & S2.  Absent: rub, click





- Extremities


Extremities: pulses symmetrical, No edema


Peripheral Pulses: within normal limits





- Abdominal


General gastrointestinal: Present: soft, non-tender, non-distended, normal 

bowel sounds


Female genitourinary: Present: normal





- Integumentary


Integumentary: Present: clear, warm, dry





- Musculoskeletal


Musculoskeletal: gait normal, strength equal bilaterally





- Psychiatric


Psychiatric: appropriate mood/affect, intact judgment & insight





- Neurologic


Neurologic: CNII-XII intact, moves all extremities











- Constitutional


Vitals: 


 











Temp Pulse Resp BP Pulse Ox


 


 97.6 F   106 H  16   152/85   98 


 


 08/22/18 16:35  08/23/18 09:15  08/23/18 09:15  08/22/18 16:35  08/23/18 09:10











General appearance: Present: no acute distress





Results





- Labs


CBC & Chem 7: 


 08/21/18 12:24





 08/21/18 12:24


Labs: 


 Laboratory Last Values











WBC  7.6 K/mm3 (4.5-11.0)   08/21/18  12:24    


 


RBC  4.85 M/mm3 (3.65-5.03)   08/21/18  12:24    


 


Hgb  15.2 gm/dl (10.1-14.3)  H  08/21/18  12:24    


 


Hct  44.9 % (30.3-42.9)  H  08/21/18  12:24    


 


MCV  93 fl (79-97)   08/21/18  12:24    


 


MCH  31 pg (28-32)   08/21/18  12:24    


 


MCHC  34 % (30-34)   08/21/18  12:24    


 


RDW  13.7 % (13.2-15.2)   08/21/18  12:24    


 


Plt Count  310 K/mm3 (140-440)   08/21/18  12:24    


 


Lymph % (Auto)  32.2 % (13.4-35.0)   08/21/18  12:24    


 


Mono % (Auto)  9.3 % (0.0-7.3)  H  08/21/18  12:24    


 


Eos % (Auto)  4.1 % (0.0-4.3)   08/21/18  12:24    


 


Baso % (Auto)  0.6 % (0.0-1.8)   08/21/18  12:24    


 


Lymph #  2.4 K/mm3 (1.2-5.4)   08/21/18  12:24    


 


Mono #  0.7 K/mm3 (0.0-0.8)   08/21/18  12:24    


 


Eos #  0.3 K/mm3 (0.0-0.4)   08/21/18  12:24    


 


Baso #  0.0 K/mm3 (0.0-0.1)   08/21/18  12:24    


 


Seg Neutrophils %  53.8 % (40.0-70.0)   08/21/18  12:24    


 


Seg Neutrophils #  4.1 K/mm3 (1.8-7.7)   08/21/18  12:24    


 


PT  12.8 Sec. (12.2-14.9)   08/21/18  12:24    


 


INR  0.92  (0.87-1.13)   08/21/18  12:24    


 


APTT  28.6 Sec. (24.2-36.6)   08/21/18  12:24    


 


D-Dimer  221.88 ng/mlDDU (0-234)   08/21/18  12:24    


 


Sodium  140 mmol/L (137-145)   08/21/18  12:24    


 


Potassium  3.5 mmol/L (3.6-5.0)  L  08/21/18  12:24    


 


Chloride  100.6 mmol/L ()   08/21/18  12:24    


 


Carbon Dioxide  26 mmol/L (22-30)   08/21/18  12:24    


 


Anion Gap  17 mmol/L  08/21/18  12:24    


 


BUN  6 mg/dL (7-17)  L  08/21/18  12:24    


 


Creatinine  0.8 mg/dL (0.7-1.2)   08/21/18  12:24    


 


Estimated GFR  > 60 ml/min  08/21/18  12:24    


 


BUN/Creatinine Ratio  8 %  08/21/18  12:24    


 


Glucose  91 mg/dL ()   08/21/18  12:24    


 


Calcium  9.4 mg/dL (8.4-10.2)   08/21/18  12:24    


 


Total Bilirubin  0.40 mg/dL (0.1-1.2)   08/21/18  12:24    


 


AST  17 units/L (5-40)   08/21/18  12:24    


 


ALT  12 units/L (7-56)   08/21/18  12:24    


 


Alkaline Phosphatase  57 units/L ()   08/21/18  12:24    


 


Troponin T  0.042 ng/mL (0.00-0.029)  H  08/21/18  19:47    


 


Total Protein  7.2 g/dL (6.3-8.2)   08/21/18  12:24    


 


Albumin  4.1 g/dL (3.9-5)   08/21/18  12:24    


 


Albumin/Globulin Ratio  1.3 %  08/21/18  12:24    


 


Triglycerides  118 mg/dL (2-149)   08/21/18  12:24    


 


Cholesterol  213 mg/dL ()  H  08/21/18  12:24    


 


LDL Cholesterol Direct  149 mg/dL ()  H  08/21/18  12:24    


 


HDL Cholesterol  60 mg/dL (40-59)  H  08/21/18  12:24    


 


Cholesterol/HDL Ratio  3.55 %  08/21/18  12:24

## 2018-08-23 NOTE — PROGRESS NOTE
Assessment and Plan





COPD exacerbation.  Possibly triggered by a combination of factors including 

recent exposure to inhaled irritants.  Controlled


Chest pain.  May be related to the above.  Currently under cardiac workup since 

cardiac enzymes are elevated.  Cardiology following


Chemical fume inhalation.  Oven  exposure


Hyperlipidemia








Recommendations





 


Continue nebs





Ambulate patient and monitor oximetry.  


Add portable oxygen 2 L/m if oximetry below 89% on room air.


Update influenza and pneumonia vaccination, if not completed already.


Candidate for pulmonary rehabilitation, once all cardiac issues cleared


Will need outpatient pulmonary evaluation, PFT workup for COPD severity 

stratification


Continue with inhaler therapy including LAMA, LABA/ICS therapy, per  [facility] 

guidelines.  She is on Symbicort, I will recommend adding Incruse or Spiriva at 

the time of discharge


Nutritional support, 


Smoking cessation discussed


Monitor, replace potassium as needed

















Subjective


Date of service: 08/23/18


Principal diagnosis: chest pain, fume exposure and COPD exacerbation


Interval history: 





Reports to be feeling much better this morning.  No shortness of breath, chest 

pain or wheezing reported





Objective


 Vital Signs - 12hr











  08/22/18 08/22/18 08/22/18





  21:17 21:18 21:30


 


Pulse Rate   


 


Pulse Rate [ 92 H  87





Anterior   





Bilateral   





Throughout]   


 


Respiratory 20  20





Rate [Anterior   





Bilateral   





Throughout]   


 


O2 Sat by Pulse  95 





Oximetry   














  08/22/18





  23:00


 


Pulse Rate 89


 


Pulse Rate [ 





Anterior 





Bilateral 





Throughout] 


 


Respiratory 





Rate [Anterior 





Bilateral 





Throughout] 


 


O2 Sat by Pulse 





Oximetry 











Constitutional: no acute distress, alert, asleep


Eyes: non-icteric


ENT: oropharynx moist


Neck: no JVD


Effort: normal


Ascultation: Bilateral: clear, diminished breath sounds


Percussion: Bilateral: not dull


Tactile fremitus: Bilateral: normal


Cardiovascular: regular rate and rhythm


Gastrointestinal: normoactive bowel sounds, non-distended


Integumentary: normal


Extremities: no cyanosis, no edema


Neurologic: normal mental status, non-focal exam, CN II-XII normal, motor 

strength normal and


Psychiatric: mood appropriate, affect normal


CBC and BMP: 


 08/21/18 12:24





 08/21/18 12:24


ABG, PT/INR, D-dimer: 


PT/INR, D-dimer











PT  12.8 Sec. (12.2-14.9)   08/21/18  12:24    


 


INR  0.92  (0.87-1.13)   08/21/18  12:24    


 


D-Dimer  221.88 ng/mlDDU (0-234)   08/21/18  12:24    








Abnormal lab findings: 


 Abnormal Labs











  08/21/18 08/21/18 08/21/18





  12:24 12:24 12:24


 


Hgb   15.2 H 


 


Hct   44.9 H 


 


Mono % (Auto)   9.3 H 


 


Potassium  3.5 L  


 


BUN  6 L  


 


Troponin T    0.039 H


 


Cholesterol    213 H


 


LDL Cholesterol Direct    149 H


 


HDL Cholesterol    60 H














  08/21/18 08/21/18





  16:01 19:47


 


Hgb  


 


Hct  


 


Mono % (Auto)  


 


Potassium  


 


BUN  


 


Troponin T  0.041 H  0.042 H


 


Cholesterol  


 


LDL Cholesterol Direct  


 


HDL Cholesterol

## 2018-08-23 NOTE — PROGRESS NOTE
Assessment and Plan





Asthma/COPD exacerbation


Non-specific troponin


   Normal LVEF by echo


Systemic Hypertension


History of breast cancer


   


Recommend:


Stress test when wheezing and COPD exacerbation has resolved.








Subjective


Date of service: 08/23/18


Principal diagnosis: chest pain, fume exposure and COPD exacerbation


Interval history: 





Patient is resting in bed comfortably. Still with active wheezing.





Objective


 Vital Signs











  Temp Pulse Pulse Resp Resp BP Pulse Ox


 


 08/23/18 09:15    106 H   16  


 


 08/23/18 09:10        98


 


 08/23/18 09:06    80   16  


 


 08/23/18 07:45   91 H     174/88  98


 


 08/23/18 05:21  98.6 F    17   


 


 08/23/18 05:20  98.2 F  94 H   18   150/89  96


 


 08/23/18 00:07  97.5 F L  94 H   18   159/85  93


 


 08/22/18 23:00   89     


 


 08/22/18 21:30    87   20  


 


 08/22/18 21:18        95


 


 08/22/18 21:17    92 H   20  


 


 08/22/18 19:25  98.0 F    18   151/81 


 


 08/22/18 19:21     24   


 


 08/22/18 16:35  97.6 F  86   20   152/85  96


 


 08/22/18 12:14  97.9 F  70   20   138/85  98














- Physical Examination


General: No Apparent Distress


HEENT: Positive: PERRL


Cardiac: Positive: Reg Rate and Rhythm


Lungs: Positive: Wheezes


Abdomen: Positive: Soft


Extremities: Absent: edema

## 2018-08-24 VITALS — SYSTOLIC BLOOD PRESSURE: 153 MMHG | DIASTOLIC BLOOD PRESSURE: 86 MMHG

## 2018-08-24 RX ADMIN — LORATADINE SCH MG: 10 TABLET ORAL at 13:33

## 2018-08-24 RX ADMIN — ARFORMOTEROL TARTRATE SCH MCG: 15 SOLUTION RESPIRATORY (INHALATION) at 08:58

## 2018-08-24 RX ADMIN — BUDESONIDE SCH MG: 0.5 INHALANT RESPIRATORY (INHALATION) at 08:57

## 2018-08-24 RX ADMIN — AMLODIPINE BESYLATE SCH MG: 10 TABLET ORAL at 13:33

## 2018-08-24 RX ADMIN — FAMOTIDINE SCH MG: 20 TABLET ORAL at 13:34

## 2018-08-24 RX ADMIN — Medication SCH ML: at 13:34

## 2018-08-24 RX ADMIN — PREDNISONE SCH MG: 20 TABLET ORAL at 13:33

## 2018-08-24 NOTE — EVENT NOTE
Date: 08/24/18





To see patient, she is out of her room on cardiovascular study.  We'll see when 

she is back for review.

## 2018-08-24 NOTE — DISCHARGE SUMMARY
Providers





- Providers


Date of Admission: 


08/21/18 13:30





Attending physician: 


SARAH OLEARY MD





 





08/21/18


Consult to Cardiac Rehabilitation [CONS] Routine 


   Reason For Exam: Phase I





08/21/18 13:32


Consult to Cardiology [CONS] Routine 


   Consulting Provider: ORLIN SKAGGS


   Reason For Exam: acs





08/22/18 11:40


Consult to Physician [CONS] Routine 


   Comment: 


   Consulting Provider: YAMILKA MITCHELL


   Physician Instructions: 


   Reason For Exam: copd exacerbation











Primary care physician: 


PRIMARY CARE MD








Hospitalization


Reason for admission: shortness of breath


Condition: Stable


Hospital course: 


61 YO Female with HTN, Breast Cancer, COPD, Asthma presents to ED for 

evaluation. Pt states that she has experienced pain her chest and shortness of 

breath over the past 1 week. Pt states that her pain is episodic in nature and 

lasts for several minutes at a time. Pt states that pain is 6/10, substernal, 

nonradiating, not worsened with exertion, relieved with rest, associated with 

shortness of breath, crushing in nature. Pt denies fever, chills,palpitatons, 

NVD, syncope, BRBPR, Unintentional weight loss, night sweats, bone pain, or 

recent ill contacts. Pt states that her breathing got progressively worse after 

she accidentally inhaled fumes while cleaning her oven with oven . Pt 

seen and evaluated in ED and found to have COPD Exacerbation, Acute Respiratory 

Failure, ACS and Diastolic CHF.  Pt admitted to telemetry. Cardiology consulted 

in ED. admission the patient was seen by cardiology who felt that her symptoms 

are more related to her pulmonary disease.  Patient unfortunately is a smoker 

who has continued to smoke although quit a few weeks ago.  She is pending a 

breast biopsy.  Cardiology did perform a stress test of the patient's pulmonary 

status was improved with no findings of reversible coronary artery disease.  

Patient significantly improved and is stable for discharge and status of 

tonsils related to her about tobacco cessation.





(1) Atypical chest pain secondary to COPD


(2) Respiratory failure with hypoxia


(3) Breast cancer


(4) COPD exacerbation


(5) Hypertension


(6) Diastolic CHF














Disposition: DC-01 TO HOME OR SELFCARE


Time spent for discharge: 35 mins





Core Measure Documentation





- Palliative Care


Palliative Care/ Comfort Measures: Not Applicable





- Core Measures


Any of the following diagnoses?: none





- VTE Discharge Requirements


Deep Vein Thrombosis/Pulmonary Embolism Present on Admission: No





Exam





- Physical Exam


Narrative exam: 


 VITAL SIGNS:  Reviewed.    


GENERAL:  The patient appeared well nourished and normally developed. Vital 

signs as documented.


HEAD:  No signs of head trauma.


EYES:  Pupils are equal.  Extraocular motions intact.  


EARS:  Hearing grossly intact.


MOUTH:  Oropharynx is normal. 


NECK:  No adenopathy, no JVD.   


CHEST:  Chest with diminished breath sounds bilaterally.  No wheezes, rales, or 

rhonchi.  


CARDIAC:  Regular rate and rhythm.  S1 and S2, without murmurs, gallops, or 

rubs.


VASCULAR:  No Edema.  Peripheral pulses normal and equal in all extremities.


ABDOMEN:  Soft, without detectable tenderness.  No sign of distention.  No   

rebound or guarding, and no masses palpated.   Bowel Sounds normal.


MUSCULOSKELETAL:  Good range of motion of all major joints. Extremities without 

clubbing, cyanosis or edema.  


NEUROLOGIC EXAM:  Alert and oriented x 3.  No focal sensory or strength 

deficits.   Speech normal.  Follows commands.


PSYCHIATRIC:  Mood normal.


SKIN:  No rash or lesions.











- Constitutional


Vitals: 


 











Temp Pulse Resp BP Pulse Ox


 


 98.6 F   85   19   166/95   99 


 


 08/24/18 07:27  08/24/18 10:19  08/24/18 09:08  08/24/18 10:19  08/24/18 08:59














Plan


Activity: advance as tolerated, fall precautions


Diet: low fat


Special Instructions: record daily BP diary, smoking cessation


Additional Instructions: continue with planned surgery


Follow up with: 


PRIMARY CARE,MD [Primary Care Provider] - 3-5 Days


RAMIREZ DEAL MD [Staff Physician] - 08/31/18 2:00 pm


REYES-BELTRAN,ANTONIO, MD [Staff Physician] - 7 Days


Prescriptions: 


Albuterol Sulfate [Ventolin HFA] 2 puff IH Q6H PRN #1 [id number]


 PRN Reason: Shortness Of Breath


Famotidine [Pepcid] 20 mg PO BID #60 tablet


Loratadine [Claritin] 10 mg PO DAILY #30 tablet


Tiotropium [Spiriva] 1 puff IH Q24HRT 30 Days  cap

## 2018-08-24 NOTE — TREADMILL REPORT
THALLIUM STRESS TEST





LEFT VENTRICLE:  Left ventricular chamber size is within normal spread. 

Perfusion study demonstrates a small, fixed apical defect of mild intensity,

otherwise homogeneous uptake of the tracer in all segments ____.  No significant

reversible defects noted.  Gated analysis demonstrates normal left ventricular

systolic function, ejection fraction 59%.





CONCLUSION:  Small anteroapical defect consistent with breast attenuation

artifact.  Otherwise, normal perfusion study, no ischemia.  Clinical correlation

is recommended.





DD: 08/24/2018 13:17

DT: 08/24/2018 23:00

JOB# [id number]  [id number]

CA/NTS

## 2018-08-24 NOTE — PROGRESS NOTE
Assessment and Plan





- Patient Problems


(1) COPD exacerbation


Current Visit: Yes   Status: Acute   


Plan to address problem: 


COPD management is directed by internal medicine and pulmonology.





Cardiac workup:


Echocardiogram shows well-preserved left ventricle function, ejection fraction 

50-55%, pulmonary artery systolic pressure of 41.


Persantine thallium stress test was completed for cardiac evaluation, results 

are pending.








Subjective


Date of service: 08/24/18


Principal diagnosis: chest pain, fume exposure and COPD exacerbation


Interval history: 





Patient underwent a Persantine thallium stress test today, results are pending.





Objective


 Vital Signs











  Temp Pulse Pulse Resp Resp Resp BP


 


 08/24/18 10:19   85      166/95


 


 08/24/18 10:18   88      164/95


 


 08/24/18 10:17   93 H      165/96


 


 08/24/18 10:16   97 H      170/97


 


 08/24/18 10:15   102 H      171/91


 


 08/24/18 09:57   68      156/88


 


 08/24/18 09:08    73   19  


 


 08/24/18 08:59    71   17  


 


 08/24/18 07:27  98.6 F  77   16    139/80


 


 08/24/18 00:05  97.5 F L  83   18    141/92


 


 08/23/18 22:00    88  20  18  20 


 


 08/23/18 21:45    84   18  


 


 08/23/18 20:20     18   


 


 08/23/18 19:42  98.3 F    18    149/85


 


 08/23/18 19:31   82     


 


 08/23/18 17:02  98.4 F  89   18    157/84


 


 08/23/18 16:58   89     


 


 08/23/18 16:18    85   18  


 


 08/23/18 16:11    85   17  














  Pulse Ox


 


 08/24/18 10:19 


 


 08/24/18 10:18 


 


 08/24/18 10:17 


 


 08/24/18 10:16 


 


 08/24/18 10:15 


 


 08/24/18 09:57 


 


 08/24/18 09:08 


 


 08/24/18 08:59  99


 


 08/24/18 07:27  98


 


 08/24/18 00:05  94


 


 08/23/18 22:00  97


 


 08/23/18 21:45 


 


 08/23/18 20:20 


 


 08/23/18 19:42 


 


 08/23/18 19:31 


 


 08/23/18 17:02  98


 


 08/23/18 16:58 


 


 08/23/18 16:18 


 


 08/23/18 16:11 














- Physical Examination


General: No Apparent Distress


HEENT: Positive: PERRL


Neck: Positive: neck supple.  Negative: JVD/HJR


Cardiac: Positive: Reg Rate and Rhythm


Lungs: Positive: Decreased Breath Sounds


Neuro: Positive: Grossly Intact


Abdomen: Positive: Soft


Skin: Positive: Clear


Extremities: Absent: edema

## 2018-08-28 NOTE — ANESTHESIA CONSULTATION
Anesthesia Consult and Med Hx


Date of service: 08/28/18





- Airway


Anesthetic Teeth Evaluation: Dentures (full upper and lower), Edentulous


ROM Head & Neck: Adequate


Mental/Hyoid Distance: Adequate


Mallampati Class: Class II


Intubation Access Assessment: Probably Good





- Pulmonary Exam


CTA: Yes (decreased breath sounds bilaterally, no wheezing.)





- Cardiac Exam


Cardiac Exam: RRR





- Pre-Operative Health Status


ASA Pre-Surgery Classification: ASA3


Proposed Anesthetic Plan: General





- Pulmonary


Hx Smoking: Yes (STARTED AT 17YO QUIT 2016)


COPD: Yes (albuterol, symbicort, spiriva)


Home Oxygen Therapy: No


Hx Sleep Apnea: No (suspicious for [place] years ago but no diagnostic testing done)





- Cardiovascular System


Hx Hypertension: Yes


Hx Heart Attack/AMI: No


Hx Angina: No


Hx Cardia Arrhythmia: No





- Central Nervous System


Hx Seizures: No


CVA: No


Hx Psychiatric Problems: No





- Gastrointestinal


Hx Gastroesophageal Reflux Disease: No





- Endocrine


Hx Renal Disease: No


Hx Liver Disease: No


Hx Insulin Dependent Diabetes: No


Hx Non-Insulin Dependent Diabetes: No


Hx Thyroid Disease: No





- Other Systems


Hx Alcohol Use: Yes (occasional)


Hx Substance Use: Yes (occasional marijuana use)


Hx Obesity: No





- Additional Comments


Anesthesia Medical History Comments: Recently admitted this month for COPD 

exacerbation brought on by house-hold irritants. Cardiac w/u during admission, 

including stress test and TTE were negative for cardiac component (see medical 

record for test results). Symptoms improved compared to baseline since d/c last 

week. ambulate

## 2018-08-29 ENCOUNTER — HOSPITAL ENCOUNTER (OUTPATIENT)
Dept: HOSPITAL 5 - OR | Age: 63
Discharge: HOME | End: 2018-08-29
Attending: SURGERY
Payer: COMMERCIAL

## 2018-08-29 VITALS — SYSTOLIC BLOOD PRESSURE: 148 MMHG | DIASTOLIC BLOOD PRESSURE: 89 MMHG

## 2018-08-29 DIAGNOSIS — C50.911: Primary | ICD-10-CM

## 2018-08-29 DIAGNOSIS — Z79.01: ICD-10-CM

## 2018-08-29 DIAGNOSIS — N60.11: ICD-10-CM

## 2018-08-29 DIAGNOSIS — Z87.891: ICD-10-CM

## 2018-08-29 DIAGNOSIS — Z72.89: ICD-10-CM

## 2018-08-29 DIAGNOSIS — C77.3: ICD-10-CM

## 2018-08-29 DIAGNOSIS — Z17.0: ICD-10-CM

## 2018-08-29 DIAGNOSIS — J44.9: ICD-10-CM

## 2018-08-29 DIAGNOSIS — M19.90: ICD-10-CM

## 2018-08-29 DIAGNOSIS — Z98.890: ICD-10-CM

## 2018-08-29 DIAGNOSIS — D24.1: ICD-10-CM

## 2018-08-29 DIAGNOSIS — R92.0: ICD-10-CM

## 2018-08-29 DIAGNOSIS — J45.909: ICD-10-CM

## 2018-08-29 DIAGNOSIS — I10: ICD-10-CM

## 2018-08-29 DIAGNOSIS — Z79.899: ICD-10-CM

## 2018-08-29 DIAGNOSIS — G47.30: ICD-10-CM

## 2018-08-29 PROCEDURE — 64450 NJX AA&/STRD OTHER PN/BRANCH: CPT

## 2018-08-29 PROCEDURE — 76098 X-RAY EXAM SURGICAL SPECIMEN: CPT

## 2018-08-29 PROCEDURE — 19281 PERQ DEVICE BREAST 1ST IMAG: CPT

## 2018-08-29 PROCEDURE — 88333 PATH CONSLTJ SURG CYTO XM 1: CPT

## 2018-08-29 PROCEDURE — 78800 RP LOCLZJ TUM 1 AREA 1 D IMG: CPT

## 2018-08-29 PROCEDURE — 38525 BIOPSY/REMOVAL LYMPH NODES: CPT

## 2018-08-29 PROCEDURE — 19282 PERQ DEVICE BREAST EA IMAG: CPT

## 2018-08-29 PROCEDURE — 88342 IMHCHEM/IMCYTCHM 1ST ANTB: CPT

## 2018-08-29 PROCEDURE — 88307 TISSUE EXAM BY PATHOLOGIST: CPT

## 2018-08-29 PROCEDURE — 19301 PARTIAL MASTECTOMY: CPT

## 2018-08-29 PROCEDURE — A9541 TC99M SULFUR COLLOID: HCPCS

## 2018-08-29 NOTE — MAMMOGRAPHY REPORT
SPECIMEN RADIOGRAPH RIGHT BREAST: 08/29/18 07:25:00





CLINICAL: Surgical excision of a known cancer and a second lesion with 

atypia..



FINDINGS: The targeted localizer clips and requires are identified 

within the specimen.



IMPRESSION: Excision of the targeted lesions.

## 2018-08-29 NOTE — MAMMOGRAPHY REPORT
NEEDLE LOCALIZATION AND HOOKWIRE PLACEMENT RIGHT BREAST:08/29/18 



CLINICAL: Right breast cancer



COMPARISON: 08/08/18



FINDINGS: Using mammographic guidance, 1% lidocaine local anesthesia 

and sterile technique, a 3.0-cm Hull needle with a hookwire was 

placed from a medial approach to localize a more anterior biopsy clip 

in a 5.0 cm Hull needle with a hookwire was placed from a medial 

approach to localize a more posterior biopsy clip.. The hook wires were 

deployed and the needles were removed. Satisfactory placement was 

confirmed on two orthogonal views. The patient tolerated the procedure 

well and there were no apparent complications.



IMPRESSION: Uncomplicated placement of 2 hook wires in the right breast 

.

## 2018-08-29 NOTE — SHORT STAY SUMMARY
Short Stay Documentation


Date of service: 08/29/18





- History


H&P: obtained from office





- Allergies and Medications


Current Medications: 


 Allergies





No Known Allergies Allergy (Verified 08/24/18 09:57)


 





 Home Medications











 Medication  Instructions  Recorded  Confirmed  Last Taken  Type


 


Amlodipine Besylate [Norvasc] 5 mg PO DAILY 08/21/18 08/29/18 08/29/18 06:15 

History


 


Budesonide/Formoterol Fumarate 2 puff IH BID 08/21/18 08/29/18 08/28/18 09:00 

History





[Symbicort 160-4.5 Mcg Inhaler]     


 


Cetirizine HCl [Zyrtec] 10 mg PO QDAY 08/21/18 08/29/18 08/28/18 09:00 History


 


Albuterol Sulfate [Ventolin HFA] 2 puff IH Q6H PRN #1 hfa.aer.ad 08/24/18 08/29/ 18 08/29/18 Rx


 


Famotidine [Pepcid] 20 mg PO BID #60 tablet 08/24/18 08/29/18 08/28/18 09:00 Rx


 


Loratadine [Claritin] 10 mg PO DAILY #30 tablet 08/24/18 08/29/18 08/28/18 09:

00 Rx


 


Tiotropium [Spiriva] 1 puff IH Q24HRT 30 Days  cap 08/24/18 08/29/18 08/29/18 06

:15 Rx


 


HYDROcodone/APAP 5-325 [[place] 1 each PO Q6HR PRN #30 tablet 08/29/18  Unknown Rx





5/325]     








Active Medications





Albuterol (Proventil)  2.5 mg IH PREOP PRN


   PRN Reason: Wheezing


   Stop: 08/29/18 17:00


Lactated Ringer's (Lactated Ringers)  1,000 mls @ 75 mls/hr IV AS DIRECT MAC


   Last Admin: 08/29/18 09:40 Dose:  75 mls/hr


Midazolam HCl (Versed)  2 mg IV PREOP NR


   Stop: 08/29/18 23:59


   Last Admin: 08/29/18 10:32 Dose:  2 mg











- Brief post op/procedure progress note


Date of procedure: 08/29/18


Pre-op diagnosis: Right breast cancer of the upper inner quadrant


Post-op diagnosis: same


Procedure: 





Right needle localization partial mastectomy with SLNB


Anesthesia: GETA


Findings: 





Wires and clips present within radiograph specimen


Surgeon: ANITHA FERRIS


Estimated blood loss: minimal


Pathology: list (right partial mastectomy, SLNB)


Specimen disposition: to lab


Condition: stable





- Disposition


Condition at discharge: Good


Disposition: DC-01 TO HOME OR SELFCARE





Short Stay Discharge Plan


Activity: other (no heavy lifting)


Diet: regular


Wound: other (keep incision clean and dry; may shower in 24 hours; no baths, 

pools or lakes; do not rub or scrub incision; wear breast binder)


Follow up with: 


SEJAL HERNANDEZ MD [Primary Care Provider] - 7 Days


Prescriptions: 


HYDROcodone/APAP 5-325 [[place] 5/325] 1 each PO Q6HR PRN #30 tablet


 PRN Reason: Pain

## 2018-08-29 NOTE — OPERATIVE REPORT
Operative Report


Operative Report: 





Date of Service: August 29, 2018





Preoperative diagnosis: Right breast cancer of the upper outer quadrant and 

right breast ADH of the upper outer quadrant





Postoperative diagnosis: Same





Procedure: Right needle localization partial mastectomy of the upper outer 

quadrant and SLNB





Surgeon: Ivania Espino MD





Anesthesia: General





Findings: Right wires and clip present within radiograph specimen;  x3 SLNs





Complications: None





EBL: Minimal





Disposition: PACU in good condition





Indications for operative procedure: This is a 62 year old lady with newly 

diagnosed right breast cancer of the upper outer quadrant and ADH of the upper 

outer quadrant, Stage I [id number] ER/OK positive. Patient wished to proceed with 

breast conservation. Patient wished to proceed with the above procedure.





Procedure in detail: The patient was taken to radiology for wire placement for 

localization known area of cancer and ADH. Patient was then taken to the 

operating room. Gen. anesthesia was administered. The right nipple was injected 

with radioisotope. The right breast and axilla were prepped and draped in the 

normal sterile operative fashion. The wire was identified. Timeout was 

performed. Gamma probe was inserted into the axilla. The area of hot spot was 

identified. A right axillary incision was made with a 15 blade knife with 

dissection taken down to the subcutaneous tissues. The axillary fascia was 

opened with the Bovie cautery. 3 SLNS were identified and dissected free. All 

remaining counts were less than 10% of the highest count. Lymph nodes were sent 

to pathology for permanent processing. Hemostasis was obtained in the right 

axillary cavity. Axillary cavity was appropriately irrigated and suctioned. 

Hemostasis was noted. Axillary fascia was approximated and closed using 

interrupted 3-0 Vicryl and the skin brought together and closed using a running 

4-0 Monocryl followed by skin affix.





Attention was then taken towards the right breast. A 3:00 breast incision was 

made with a 15 blade knife and dissection taken down to subcutaneous tissues. 

First began raising of the lateral flap with removal of the wires from the skin 

with dissection take down to the pectoralis muscle, followed by raising of the 

medial flap, superior flap and inferior flap with all flaps taken down to the 

pectoralis muscle. The breast area of concern was appropriately removed 

posteriorly from the pectoralis muscle with the aid of the Bovie cautery. The 

wires were not encountered. Specimen was marked and then sent to pathology and 

radiology; radiograph specimen with wires and clips present. Additional caudal 

posterior margin was taken and marked. Breast cavity was irrigated and 

hemostasis was obtained. The posterior deep breast tissues were approximated 

and closed using interrupted 3-0 Vicryl. The subcutaneous tissues were 

approximated and closed using interrupted 3-0 Vicryl followed by closing of the 

skin with a running 4-0 Monocryl and skin affix. The patient tolerated surgery 

very well and she was awaken from anesthesia without any complication and 

transported to PACU in good condition.

## 2018-09-17 ENCOUNTER — HOSPITAL ENCOUNTER (OUTPATIENT)
Dept: HOSPITAL 5 - XRAY | Age: 63
Discharge: HOME | End: 2018-09-17
Attending: INTERNAL MEDICINE
Payer: COMMERCIAL

## 2018-09-17 DIAGNOSIS — M19.90: ICD-10-CM

## 2018-09-17 DIAGNOSIS — J44.1: Primary | ICD-10-CM

## 2018-09-17 DIAGNOSIS — I10: ICD-10-CM

## 2018-09-17 DIAGNOSIS — Z87.891: ICD-10-CM

## 2018-09-17 PROCEDURE — 71046 X-RAY EXAM CHEST 2 VIEWS: CPT

## 2018-09-17 NOTE — XRAY REPORT
CHEST XRAY, 2 VIEWS:



History: Chronic obstructive pulmonary disease with exacerbation.



Findings:



There is mild diffuse interstitial coarsening.  The lungs are 

hyperexpanded but clear.  No infiltrate, pleural fluid or pneumothorax 

is detected.  The cardiac silhouette and pulmonary vasculature are 

within normal limits for technique. The bony thorax is unremarkable.



IMPRESSION:

Changes consistent with COPD.  No acute cardiopulmonary process. No 

significant change since 8/21/18.

## 2019-01-15 ENCOUNTER — HOSPITAL ENCOUNTER (OUTPATIENT)
Dept: HOSPITAL 5 - SPVWC | Age: 64
Discharge: HOME | End: 2019-01-15
Attending: SURGERY
Payer: COMMERCIAL

## 2019-01-15 DIAGNOSIS — I50.30: ICD-10-CM

## 2019-01-15 DIAGNOSIS — C50.211: Primary | ICD-10-CM

## 2019-01-15 DIAGNOSIS — Z87.891: ICD-10-CM

## 2019-01-15 DIAGNOSIS — J44.9: ICD-10-CM

## 2019-01-15 DIAGNOSIS — M19.90: ICD-10-CM

## 2019-01-15 DIAGNOSIS — I11.0: ICD-10-CM

## 2019-01-15 NOTE — MAMMOGRAPHY REPORT
RIGHT DIGITAL DIAGNOSTIC MAMMOGRAM WITH CAD: 01/15/19 08:50:00





CLINICAL: Breast cancer survivor status post right partial mastectomy 

08/29/18.



COMPARISON:08/08/18



FINDINGS: The breast is heterogeneously dense, which may obscure small 

masses.  Inner postsurgical scar where two biopsy clips have been 

removed.  2 remaining biopsy clips correlate with benign biopsy sites.  

Moderate lower inner skin thickening.  No mass, suspicious 

architectural distortion or suspicious calcifications.  



IMPRESSION: No mammographic evidence of malignancy.Benign posttreatment 

changes.



BI-RADS CATEGORY:  2 -- Benign



RECOMMENDATION: Routine screening in one year.



ACR BI-RADS MAMMOGRAPHIC CODES:

0 = Needs additional imaging evaluation; 1 = Negative; 2 = Benign; 3 = 

Probably benign; 4 = Suspicious; 5 = Malignant; 6 = Known biopsy-proven 

malignancy



COMMENT:

      1.   Dense breast tissue, i.e., adenosis, fibrocystic 

            changes, etc., may obscure an underlying neoplasm.

      2.   Approximately 10% of cancers are not detected with

            mammography.

      3.   A negative mammography report should not delay biopsy 

            if a clinically suspicious mass is present.





COMMENT:

Patient follow-up letters are generated via our [employer] Nurse

Navigator application.

## 2019-02-06 ENCOUNTER — HOSPITAL ENCOUNTER (EMERGENCY)
Dept: HOSPITAL 5 - ED | Age: 64
LOS: 1 days | Discharge: HOME | End: 2019-02-07
Payer: COMMERCIAL

## 2019-02-06 DIAGNOSIS — J44.1: Primary | ICD-10-CM

## 2019-02-06 DIAGNOSIS — Z87.891: ICD-10-CM

## 2019-02-06 DIAGNOSIS — M19.90: ICD-10-CM

## 2019-02-06 DIAGNOSIS — I10: ICD-10-CM

## 2019-02-06 DIAGNOSIS — Z98.51: ICD-10-CM

## 2019-02-06 DIAGNOSIS — F41.9: ICD-10-CM

## 2019-02-06 LAB
APTT BLD: 23.8 SEC. (ref 24.2–36.6)
BASOPHILS # (AUTO): 0.1 K/MM3 (ref 0–0.1)
BASOPHILS NFR BLD AUTO: 0.6 % (ref 0–1.8)
BUN SERPL-MCNC: 7 MG/DL (ref 7–17)
BUN/CREAT SERPL: 10 %
CALCIUM SERPL-MCNC: 9.3 MG/DL (ref 8.4–10.2)
EOSINOPHIL # BLD AUTO: 0.1 K/MM3 (ref 0–0.4)
EOSINOPHIL NFR BLD AUTO: 1.7 % (ref 0–4.3)
HCT VFR BLD CALC: 47.6 % (ref 30.3–42.9)
HEMOLYSIS INDEX: 18
HGB BLD-MCNC: 16 GM/DL (ref 10.1–14.3)
INR PPP: 0.88 (ref 0.87–1.13)
LYMPHOCYTES # BLD AUTO: 0.6 K/MM3 (ref 1.2–5.4)
LYMPHOCYTES NFR BLD AUTO: 7.1 % (ref 13.4–35)
MCHC RBC AUTO-ENTMCNC: 34 % (ref 30–34)
MCV RBC AUTO: 96 FL (ref 79–97)
MONOCYTES # (AUTO): 0.2 K/MM3 (ref 0–0.8)
MONOCYTES % (AUTO): 2.3 % (ref 0–7.3)
PLATELET # BLD: 273 K/MM3 (ref 140–440)
RBC # BLD AUTO: 4.98 M/MM3 (ref 3.65–5.03)

## 2019-02-06 PROCEDURE — 82550 ASSAY OF CK (CPK): CPT

## 2019-02-06 PROCEDURE — 93005 ELECTROCARDIOGRAM TRACING: CPT

## 2019-02-06 PROCEDURE — 85025 COMPLETE CBC W/AUTO DIFF WBC: CPT

## 2019-02-06 PROCEDURE — 85379 FIBRIN DEGRADATION QUANT: CPT

## 2019-02-06 PROCEDURE — 36415 COLL VENOUS BLD VENIPUNCTURE: CPT

## 2019-02-06 PROCEDURE — 85730 THROMBOPLASTIN TIME PARTIAL: CPT

## 2019-02-06 PROCEDURE — 99284 EMERGENCY DEPT VISIT MOD MDM: CPT

## 2019-02-06 PROCEDURE — 71046 X-RAY EXAM CHEST 2 VIEWS: CPT

## 2019-02-06 PROCEDURE — 85610 PROTHROMBIN TIME: CPT

## 2019-02-06 PROCEDURE — 93010 ELECTROCARDIOGRAM REPORT: CPT

## 2019-02-06 PROCEDURE — 96375 TX/PRO/DX INJ NEW DRUG ADDON: CPT

## 2019-02-06 PROCEDURE — 96365 THER/PROPH/DIAG IV INF INIT: CPT

## 2019-02-06 PROCEDURE — 80048 BASIC METABOLIC PNL TOTAL CA: CPT

## 2019-02-06 PROCEDURE — 83735 ASSAY OF MAGNESIUM: CPT

## 2019-02-06 PROCEDURE — 94644 CONT INHLJ TX 1ST HOUR: CPT

## 2019-02-06 PROCEDURE — 84484 ASSAY OF TROPONIN QUANT: CPT

## 2019-02-06 NOTE — EMERGENCY DEPARTMENT REPORT
Blank Doc





- Documentation


Documentation: 





63 y.o. female presents with shortness of breath.  Reports productive cough and 

increasing SOB.  She did 6-8 treatments at home.  History of COPD, not on home 

oxygen.  Shortness of breath is worse with exertion.


  Denies chest pain, edema, or pain


Exam:  Rhonchi throughout


Placed on oxygen


Chest XR and labs


Main side for evaluation.

## 2019-02-06 NOTE — XRAY REPORT
FINAL REPORT



EXAM:  XR CHEST ROUTINE 2V



HISTORY:  Shortness of breath. 



TECHNIQUE:  PA and lateral views of the chest were obtained.



PRIORS:  None.



FINDINGS:  

There are no focal consolidations to suggest pneumonia. No large pleural effusion. No pneumothorax. T
ortuosity and atherosclerotic vascular calcifications of the thoracic aorta. Cardiac silhouette and m
ediastinal structures are otherwise unremarkable. No acute osseous abnormality identified. 



IMPRESSION:  

No radiographic evidence of acute cardiopulmonary disease.

## 2019-02-06 NOTE — EMERGENCY DEPARTMENT REPORT
ED Shortness of Breath HPI





- General


Chief Complaint: Dyspnea/Respdistress


Stated Complaint: SOB


Time Seen by Provider: 02/06/19 20:14


Source: patient, EMS (ems notes not available at time of  chart dictation), RN 

notes reviewed, old records reviewed


Mode of arrival: Stretcher


Limitations: Other (physical limitation)





- History of Present Illness


Initial Comments: 





This is a pleasant 63-year-old female.  Her primary care doctor is Dr. Hill.





I have evaluated this patient in the past.





Past medical history includes COPD, not currently on home oxygen (does not have 

a private pulmonologist,), breast cancer, status post lumpectomy, with reported 

clean margins, and a recent negative mammogram, not currently on chemotherapy, 

but reports a distant history of radiation therapy.





Admitted to this hospital in August, and had a negative nuclear stress test.





Patient presents to the emergency room today with a complaint of painless 

shortness of breath, cough, wheezing, yellow mucus production.  The symptoms 

have been present for the past day and a half to 2 days.  They're constant, 

worse with physical exertion, and decreased with rest.  She denies posterior leg

pain, posterior leg swelling, recent travel, recent hospitalizations, and also 

denies DVT, pulmonary embolus risk factors.





In the past, has had a negative d-dimer and negative troponins.  She reports 

coughing and sneezing, and reports subjectively feels like her COPD is acting 

up.





Currently taking steroids, but doesn't know the dose.


MD Complaint: shortness of breath, cough


-: Gradual, days(s)


Severity: moderate


Consistency: constant


Improves With: oxygen, rest, bronchodilators, upright position, medication


Worsens With: lying flat, exertion


Known History Of: COPD


Associated Symptoms: cough


Treatments Prior to Arrival: other (patient reports using nebulizers at home, 

currently taking steroids)





- Related Data


Home Oxygen Therapy: No


                                Home Medications











 Medication  Instructions  Recorded  Confirmed  Last Taken


 


Amlodipine Besylate [Norvasc] 5 mg PO DAILY 08/21/18 08/29/18 08/29/18 06:15


 


Budesonide/Formoterol Fumarate 2 puff IH BID 08/21/18 08/29/18 08/28/18 09:00





[Symbicort 160-4.5 Mcg Inhaler]    


 


Cetirizine HCl [Zyrtec] 10 mg PO QDAY 08/21/18 08/29/18 08/28/18 09:00








                                  Previous Rx's











 Medication  Instructions  Recorded  Last Taken  Type


 


Albuterol Sulfate [Ventolin HFA] 2 puff IH Q6H PRN #1 hfa.aer.ad 08/24/18 08/29/18 Rx


 


Famotidine [Pepcid] 20 mg PO BID #60 tablet 08/24/18 08/28/18 09:00 Rx


 


Loratadine [Claritin] 10 mg PO DAILY #30 tablet 08/24/18 08/28/18 09:00 Rx


 


Tiotropium [Spiriva] 1 puff IH Q24HRT 30 Days  cap 08/24/18 08/29/18 06:15 Rx


 


HYDROcodone/APAP 5-325 [[place] 1 each PO Q6HR PRN #30 tablet 08/29/18 Unknown Rx





5/325]    


 


Albuterol Sulfate [Albuterol 0.63% 0.63 mg IH Q4HR PRN #2 ml 02/07/19 Unknown Rx





NEBS]    


 


Albuterol Sulfate [Proair 90 mcg IH Q4HR PRN #2 aer.pow.ba 02/07/19 Unknown Rx





Respiclick]    


 


Doxycycline [Vibramycin] 100 mg PO Q12HR #10 capsule 02/07/19 Unknown Rx


 


Ipratropium (Nf) [Atrovent] 2 puff IH Q6HR PRN #1 inha 02/07/19 Unknown Rx


 


Ipratropium [Atrovent NEB] 0.5 mg IH Q4HR #2 ml 02/07/19 Unknown Rx


 


methylPREDNISolone [Medrol] 4 mg PO QDAY #1 tab.ds.pk 02/07/19 Unknown Rx











                                    Allergies











Allergy/AdvReac Type Severity Reaction Status Date / Time


 


No Known Allergies Allergy   Verified 08/24/18 09:57














ED Review of Systems


ROS: 


Stated complaint: SOB


Other details as noted in HPI





Constitutional: malaise


Eyes: denies: vision change


ENT: congestion, other (sneezing, congestion)


Respiratory: cough, shortness of breath, SOB with exertion, wheezing


Cardiovascular: dyspnea on exertion.  denies: chest pain


Gastrointestinal: denies: vomiting


Genitourinary: denies: dysuria


Musculoskeletal: arthralgia


Skin: denies: lesions


Neurological: weakness


Psychiatric: anxiety





ED Past Medical Hx





- Past Medical History


Hx Hypertension: Yes (15Y)


Hx Arthritis: Yes


Hx Asthma: Yes


Hx COPD: Yes


Hx HIV: No





- Surgical History


Additional Surgical History: Right knee orthoscopy. right breast lumpectomy and 

lymph nodes removed.  Tubal ligation





- Social History


Smoking Status: Former Smoker


Substance Use Type: Alcohol





- Medications


Home Medications: 


                                Home Medications











 Medication  Instructions  Recorded  Confirmed  Last Taken  Type


 


Amlodipine Besylate [Norvasc] 5 mg PO DAILY 08/21/18 08/29/18 08/29/18 06:15 

History


 


Budesonide/Formoterol Fumarate 2 puff IH BID 08/21/18 08/29/18 08/28/18 09:00 

History





[Symbicort 160-4.5 Mcg Inhaler]     


 


Cetirizine HCl [Zyrtec] 10 mg PO QDAY 08/21/18 08/29/18 08/28/18 09:00 History


 


Albuterol Sulfate [Ventolin HFA] 2 puff IH Q6H PRN #1 hfa.aer.ad 08/24/18 08/29/18 08/29/18 Rx


 


Famotidine [Pepcid] 20 mg PO BID #60 tablet 08/24/18 08/29/18 08/28/18 09:00 Rx


 


Loratadine [Claritin] 10 mg PO DAILY #30 tablet 08/24/18 08/29/18 08/28/18 09:00

 Rx


 


Tiotropium [Spiriva] 1 puff IH Q24HRT 30 Days  cap 08/24/18 08/29/18 08/29/18 

06:15 Rx


 


HYDROcodone/APAP 5-325 [[place] 1 each PO Q6HR PRN #30 tablet 08/29/18  Unknown Rx





5/325]     


 


Albuterol Sulfate [Albuterol 0.63% 0.63 mg IH Q4HR PRN #2 ml 02/07/19  Unknown 

Rx





NEBS]     


 


Albuterol Sulfate [Proair 90 mcg IH Q4HR PRN #2 aer.pow.ba 02/07/19  Unknown Rx





Respiclick]     


 


Doxycycline [Vibramycin] 100 mg PO Q12HR #10 capsule 02/07/19  Unknown Rx


 


Ipratropium (Nf) [Atrovent] 2 puff IH Q6HR PRN #1 inha 02/07/19  Unknown Rx


 


Ipratropium [Atrovent NEB] 0.5 mg IH Q4HR #2 ml 02/07/19  Unknown Rx


 


methylPREDNISolone [Medrol] 4 mg PO QDAY #1 tab.ds.pk 02/07/19  Unknown Rx














ED Physical Exam





- General


Limitations: Physical Limitation


General appearance: alert, anxious





- Head


Head exam: Present: atraumatic, normocephalic





- Eye


Eye exam: Present: normal appearance, EOMI.  Absent: nystagmus





- ENT


ENT exam: Present: normal exam, normal orophraynx, mucous membranes moist, 

normal external ear exam





- Neck


Neck exam: Present: normal inspection, full ROM.  Absent: tenderness, 

meningismus





- Respiratory


Respiratory exam: Present: decreased breath sounds.  Absent: wheezes, rales





- Cardiovascular


Cardiovascular Exam: Present: regular rate, normal rhythm, normal heart sounds. 

 Absent: bradycardia, tachycardia, irregular rhythm, systolic murmur, diastolic 

murmur, rubs, gallop





- GI/Abdominal


GI/Abdominal exam: Present: soft.  Absent: distended, tenderness, guarding, 

rebound, rigid, pulsatile mass





- Extremities Exam


Extremities exam: Present: normal inspection, full ROM, other (2+ pulses noted 

in the bilateral upper, lower extremities.  Compartments soft.  No long bony 

tenderness.  The pelvis is stable.).  Absent: pedal edema, joint swelling, calf 

tenderness (there is no palpable cord.  There is a negative Homans sign.)





- Back Exam


Back exam: Present: normal inspection, full ROM.  Absent: tenderness, CVA tender

ness (R), paraspinal tenderness, vertebral tenderness





- Neurological Exam


Neurological exam: Present: alert, oriented X3, CN II-XII intact, other 

(Extraocular movements intact.  Tongue midline.  No facial droop.  Facial 

sensation intact to light touch in the V1, V2, V3 distribution bilaterally.  5 

and 5 strength in 4 extremities..  Sensation is intact to light touch in 4 

extremities.).  Absent: motor sensory deficit





- Psychiatric


Psychiatric exam: Present: anxious





- Skin


Skin exam: Present: warm, dry, intact, normal color.  Absent: rash





ED Course


                                   Vital Signs











  02/06/19 02/06/19 02/06/19





  20:16 20:48 21:00


 


Temperature 97.9 F  


 


Pulse Rate 98 H  93 H


 


Pulse Rate [   





Bilateral   





Throughout]   


 


Respiratory 26 H  22





Rate   


 


Respiratory   





Rate [Bilateral   





Throughout]   


 


Blood Pressure 123/100 144/86 144/86


 


O2 Sat by Pulse 99  98





Oximetry   














  02/06/19 02/06/19 02/06/19





  21:15 21:16 21:30


 


Temperature   


 


Pulse Rate  95 H 93 H


 


Pulse Rate [ 93 H  





Bilateral   





Throughout]   


 


Respiratory  24 23





Rate   


 


Respiratory 27 H  





Rate [Bilateral   





Throughout]   


 


Blood Pressure  153/90 144/86


 


O2 Sat by Pulse  99 100





Oximetry   














  02/06/19





  21:46


 


Temperature 


 


Pulse Rate 91 H


 


Pulse Rate [ 





Bilateral 





Throughout] 


 


Respiratory 18





Rate 


 


Respiratory 





Rate [Bilateral 





Throughout] 


 


Blood Pressure 127/87


 


O2 Sat by Pulse 100





Oximetry 














- Reevaluation(s)


Reevaluation #1: 





02/06/19 22:51


Differential diagnosis, including but not limited to: Pneumonia, bronchitis, 

progression of COPD, pneumothorax, pulmonary embolus





Assessment and plan: 63-year-old female, with negative d-dimer in the past, not 

tachycardic, with no lower extremity pain, swelling, with probable COPD 

exacerbation.  Patient low risk by well's criteria, in my opinion, does not 

require further risk stratification for pulmonary embolus given her negative 

d-dimer.  Most likely, she is experiencing the natural progression of her 

underlying COPD.  She will be given albuterol, Atrovent, steroids, magnesium, IV

fluids, an x-ray of the chest will be obtained.  Troponin negative 1, EKG 

appears to be unchanged from prior, recently had a negative nuclear stress test,

find the patient to be low risk by the IMANI score, and low risk by Heart score, 

in addition, clinical history by my gestalt does not appear to be consistent 

with acute coronary syndrome.











Patient will also be given trial of ambulation by nursing staff.


Reevaluation #2: 





02/07/19 00:12


The patient is reassessed.  She feels much better.  She walks without significan

t desaturation, and does desaturate to 88, 89%.  This would be expected with an 

underlying history of COPD, does not merits supplemental oxygen.  She reports 

readiness for discharge, she has been counseled to follow up with an outpatient 

pulmonologist.  She is smiling, talking in full sentences, and appears to be 

much more comfortable.





ED Medical Decision Making





- Lab Data


Result diagrams: 


                                 02/06/19 20:27





                                 02/06/19 20:27








                                   Vital Signs











  02/06/19 02/06/19 02/06/19





  20:16 20:48 21:00


 


Temperature 97.9 F  


 


Pulse Rate 98 H  93 H


 


Pulse Rate [   





Bilateral   





Throughout]   


 


Respiratory 26 H  22





Rate   


 


Respiratory   





Rate [Bilateral   





Throughout]   


 


Blood Pressure 123/100 144/86 144/86


 


O2 Sat by Pulse 99  98





Oximetry   














  02/06/19 02/06/19 02/06/19





  21:15 21:16 21:30


 


Temperature   


 


Pulse Rate  95 H 93 H


 


Pulse Rate [ 93 H  





Bilateral   





Throughout]   


 


Respiratory  24 23





Rate   


 


Respiratory 27 H  





Rate [Bilateral   





Throughout]   


 


Blood Pressure  153/90 144/86


 


O2 Sat by Pulse  99 100





Oximetry   














  02/06/19





  21:46


 


Temperature 


 


Pulse Rate 91 H


 


Pulse Rate [ 





Bilateral 





Throughout] 


 


Respiratory 18





Rate 


 


Respiratory 





Rate [Bilateral 





Throughout] 


 


Blood Pressure 127/87


 


O2 Sat by Pulse 100





Oximetry 











                                   Lab Results











  02/06/19 02/06/19 02/06/19 Range/Units





  20:27 20:27 21:13 


 


WBC   8.6   (4.5-11.0)  K/mm3


 


RBC   4.98   (3.65-5.03)  M/mm3


 


Hgb   16.0 H   (10.1-14.3)  gm/dl


 


Hct   47.6 H   (30.3-42.9)  %


 


MCV   96   (79-97)  fl


 


MCH   32   (28-32)  pg


 


MCHC   34   (30-34)  %


 


RDW   13.8   (13.2-15.2)  %


 


Plt Count   273   (140-440)  K/mm3


 


Lymph % (Auto)   7.1 L   (13.4-35.0)  %


 


Mono % (Auto)   2.3   (0.0-7.3)  %


 


Eos % (Auto)   1.7   (0.0-4.3)  %


 


Baso % (Auto)   0.6   (0.0-1.8)  %


 


Lymph #   0.6 L   (1.2-5.4)  K/mm3


 


Mono #   0.2   (0.0-0.8)  K/mm3


 


Eos #   0.1   (0.0-0.4)  K/mm3


 


Baso #   0.1   (0.0-0.1)  K/mm3


 


Seg Neutrophils %   88.3 H   (40.0-70.0)  %


 


Seg Neutrophils #   7.6   (1.8-7.7)  K/mm3


 


PT    12.3  (12.2-14.9)  Sec.


 


INR    0.88  (0.87-1.13)  


 


APTT    23.8 L  (24.2-36.6)  Sec.


 


D-Dimer    196.69  (0-234)  ng/mlDDU


 


Sodium  136 L    (137-145)  mmol/L


 


Potassium  3.5 L    (3.6-5.0)  mmol/L


 


Chloride  100.9    ()  mmol/L


 


Carbon Dioxide  24    (22-30)  mmol/L


 


Anion Gap  15    mmol/L


 


BUN  7    (7-17)  mg/dL


 


Creatinine  0.7    (0.7-1.2)  mg/dL


 


Estimated GFR  > 60    ml/min


 


BUN/Creatinine Ratio  10    %


 


Glucose  122 H    ()  mg/dL


 


Calcium  9.3    (8.4-10.2)  mg/dL


 


Magnesium     (1.7-2.3)  mg/dL


 


Total Creatine Kinase     ()  units/L


 


Troponin T     (0.00-0.029)  ng/mL














  02/06/19 Range/Units





  21:13 


 


WBC   (4.5-11.0)  K/mm3


 


RBC   (3.65-5.03)  M/mm3


 


Hgb   (10.1-14.3)  gm/dl


 


Hct   (30.3-42.9)  %


 


MCV   (79-97)  fl


 


MCH   (28-32)  pg


 


MCHC   (30-34)  %


 


RDW   (13.2-15.2)  %


 


Plt Count   (140-440)  K/mm3


 


Lymph % (Auto)   (13.4-35.0)  %


 


Mono % (Auto)   (0.0-7.3)  %


 


Eos % (Auto)   (0.0-4.3)  %


 


Baso % (Auto)   (0.0-1.8)  %


 


Lymph #   (1.2-5.4)  K/mm3


 


Mono #   (0.0-0.8)  K/mm3


 


Eos #   (0.0-0.4)  K/mm3


 


Baso #   (0.0-0.1)  K/mm3


 


Seg Neutrophils %   (40.0-70.0)  %


 


Seg Neutrophils #   (1.8-7.7)  K/mm3


 


PT   (12.2-14.9)  Sec.


 


INR   (0.87-1.13)  


 


APTT   (24.2-36.6)  Sec.


 


D-Dimer   (0-234)  ng/mlDDU


 


Sodium   (137-145)  mmol/L


 


Potassium   (3.6-5.0)  mmol/L


 


Chloride   ()  mmol/L


 


Carbon Dioxide   (22-30)  mmol/L


 


Anion Gap   mmol/L


 


BUN   (7-17)  mg/dL


 


Creatinine   (0.7-1.2)  mg/dL


 


Estimated GFR   ml/min


 


BUN/Creatinine Ratio   %


 


Glucose   ()  mg/dL


 


Calcium   (8.4-10.2)  mg/dL


 


Magnesium  2.00  (1.7-2.3)  mg/dL


 


Total Creatine Kinase  148 H  ()  units/L


 


Troponin T  < 0.010  (0.00-0.029)  ng/mL














- EKG Data


-: EKG Interpreted by Me


EKG shows normal: sinus rhythm





- EKG Data





02/06/19 22:51


Sinus, 88 bpm, normal axis, QTC prolonged, motion artifact, low voltage, not 

consistent with ST elevation myocardial infarction, when compared to prior EKG 

from August 2018, appears grossly unchanged from prior.





- Radiology Data


Radiology results: pending


Critical care attestation.: 


If time is entered above; I have spent that time in minutes in the direct care o

f this critically ill patient, excluding procedure time.








ED Disposition


Clinical Impression: 


 COPD exacerbation





Disposition: DC-01 TO HOME OR SELFCARE


Is pt being admited?: No


Does the pt Need Aspirin: No


Condition: Stable


Instructions:  Emphysema (ED)


Additional Instructions: 


Take the medications as needed/directed/instructed.  Follow up with a 

pulmonologist within the next 2 weeks.  Return to the ER right away with new, 

worsening or different symptoms, change in mental status, projectile vomiting, i

nability to speak, and inability to breathe, or different symptoms.  Avoid 

consumption of tobacco, cigarettes.  Avoid exposure to pollen, seasonal 

allergens.


Referrals: 


GENA CRAVEN MD [Staff Physician] - 3-5 Days


MARCOS FLOREZ MD [Staff Physician] - 3-5 Days


YAMILKA MITCHELL MD [Staff Physician] - 3-5 Days

## 2019-02-07 VITALS — DIASTOLIC BLOOD PRESSURE: 85 MMHG | SYSTOLIC BLOOD PRESSURE: 139 MMHG

## 2019-04-09 ENCOUNTER — HOSPITAL ENCOUNTER (OUTPATIENT)
Dept: HOSPITAL 5 - SPVWC | Age: 64
Discharge: HOME | End: 2019-04-09
Attending: SURGERY
Payer: COMMERCIAL

## 2019-04-09 DIAGNOSIS — M19.90: ICD-10-CM

## 2019-04-09 DIAGNOSIS — C50.211: Primary | ICD-10-CM

## 2019-04-09 DIAGNOSIS — I50.30: ICD-10-CM

## 2019-04-09 DIAGNOSIS — I11.0: ICD-10-CM

## 2019-04-09 DIAGNOSIS — J44.9: ICD-10-CM

## 2019-04-09 PROCEDURE — 77066 DX MAMMO INCL CAD BI: CPT

## 2019-04-09 NOTE — MAMMOGRAPHY REPORT
BILATERAL DIGITAL DIAGNOSTIC MAMMOGRAM WITH CAD : 04/09/19 09:22:00





CLINICAL: Breast cancer survivor status post right partial mastectomy 

08/29/18 with subsequent radiation therapy.



COMPARISON:01/15/19 right mammogram and 04/02/18 bilateral mammogram.



FINDINGS: The breasts are heterogeneously dense, which may obscure 

small masses.  Less prominent right inner posterior postsurgical scar. 

Increased right skin thickening and slightly diffuse increased density 

of the right breast since the last exam. No mass, suspicious 

architectural distortion or suspicious calcifications.  





IMPRESSION: No mammographic evidence of malignancy.  



BI-RADS CATEGORY:  2 -- Benign



RECOMMENDATION: Clinical followup and routine mammographic screening.

COMMENT:

Patient follow-up letters are generated via our [employer] application.

## 2020-04-14 ENCOUNTER — HOSPITAL ENCOUNTER (OUTPATIENT)
Dept: HOSPITAL 5 - SPVWC | Age: 65
Discharge: HOME | End: 2020-04-14
Attending: SURGERY
Payer: COMMERCIAL

## 2020-04-14 DIAGNOSIS — Z12.31: Primary | ICD-10-CM

## 2020-04-14 PROCEDURE — 77067 SCR MAMMO BI INCL CAD: CPT

## 2020-04-14 NOTE — MAMMOGRAPHY REPORT
DIGITAL SCREENING MAMMOGRAM WITH CAD, 4/14/2020



INDICATION: Routine screening mammography. 



TECHNIQUE:  Digital bilateral  2D mammography was obtained in the craniocaudal and mediolateral obliq
ue projections. This examination was interpreted with the benefit of Computer-Aided Detection analysi
s.



COMPARISON: 1/15/2019, 8/8/2018



FINDINGS: 



Breast Density: The breasts are heterogeneously dense, which may obscure small masses.



There is no evidence of dominant mass, suspicious calcifications or architectural distortion in eithe
r breast. Postlumpectomy and radiation changes are again noted within the right breast. There are a f
ew scattered benign calcifications throughout the right breast as well as a clip in the upper outer q
uadrant anteriorly, in the right breast. There has been interval clip placement/biopsy in the area of
 prior lumpectomy.



IMPRESSION: Benign findings.



Follow up recommendation: Routine yearly



BI-RADS Category 2:  Benign.



A "normal" or negative report should not discourage follow up or biopsy of a clinically significant f
inding.



A written summary of these findings will be mailed to the patient. The patient will be entered into a
 mammography reporting system which will generate a reminder letter for the patient's next appointmen
t at the appropriate interval.



The [employer] recommends yearly mammograms starting at age 40 and continuing as l
andrea as a woman is in good health.  Breast MRI is recommended for women with an approximate 20-25% or 
greater lifetime risk of breast cancer, including women with a strong family history of breast or ova
el cancer or who have been treated for Hodgkin's disease.



Signer Name: Jaquelin Oglesby MD 

Signed: 4/14/2020 9:06 AM

Workstation Name: [email or web address]

## 2021-04-15 ENCOUNTER — HOSPITAL ENCOUNTER (OUTPATIENT)
Dept: HOSPITAL 5 - SPVWC | Age: 66
Discharge: HOME | End: 2021-04-15
Attending: SURGERY
Payer: MEDICARE

## 2021-04-15 DIAGNOSIS — R92.1: ICD-10-CM

## 2021-04-15 DIAGNOSIS — Z12.31: Primary | ICD-10-CM

## 2021-04-15 PROCEDURE — 77063 BREAST TOMOSYNTHESIS BI: CPT

## 2021-04-15 PROCEDURE — 77067 SCR MAMMO BI INCL CAD: CPT
